# Patient Record
Sex: FEMALE | Race: WHITE | NOT HISPANIC OR LATINO | Employment: FULL TIME | URBAN - METROPOLITAN AREA
[De-identification: names, ages, dates, MRNs, and addresses within clinical notes are randomized per-mention and may not be internally consistent; named-entity substitution may affect disease eponyms.]

---

## 2017-02-21 ENCOUNTER — EXTERNAL ORDER RESULTS (OUTPATIENT)
Dept: HEALTH INFORMATION MANAGEMENT | Facility: CLINIC | Age: 57
End: 2017-02-21

## 2017-02-21 ENCOUNTER — TRANSFERRED RECORDS (OUTPATIENT)
Dept: HEALTH INFORMATION MANAGEMENT | Facility: CLINIC | Age: 57
End: 2017-02-21

## 2017-02-21 LAB — PAP SMEAR - HIM PATIENT REPORTED: NORMAL

## 2017-02-28 ENCOUNTER — MYC REFILL (OUTPATIENT)
Dept: FAMILY MEDICINE | Facility: CLINIC | Age: 57
End: 2017-02-28

## 2017-02-28 ENCOUNTER — MYC MEDICAL ADVICE (OUTPATIENT)
Dept: FAMILY MEDICINE | Facility: CLINIC | Age: 57
End: 2017-02-28

## 2017-02-28 DIAGNOSIS — E03.9 ACQUIRED HYPOTHYROIDISM: ICD-10-CM

## 2017-02-28 RX ORDER — LEVOTHYROXINE SODIUM 100 UG/1
100 TABLET ORAL EVERY MORNING
Qty: 90 TABLET | Refills: 1 | Status: CANCELLED | OUTPATIENT
Start: 2017-02-28

## 2017-02-28 NOTE — TELEPHONE ENCOUNTER
levothyroxine (SYNTHROID) 100 MCG tablet    Last Written Prescription Date: 08/26/16  Last Quantity: 90, # refills: 1  Last Office Visit with G, P or [facility] prescribing provider: Dr. Dorado, 12/16/16       TSH   Date Value Ref Range Status   12/16/2016 4.04 (H) 0.40 - 4.00 mU/L Final

## 2017-03-01 RX ORDER — LEVOTHYROXINE SODIUM 100 UG/1
100 TABLET ORAL EVERY MORNING
Qty: 90 TABLET | Refills: 0 | Status: SHIPPED | OUTPATIENT
Start: 2017-03-01 | End: 2017-05-24

## 2017-03-01 NOTE — TELEPHONE ENCOUNTER
Message from MyChart:  Original authorizing provider: Hector Dorado MD    Claudia Ellison would like a refill of the following medications:  levothyroxine (SYNTHROID) 100 MCG tablet [Hector Dorado MD]    Preferred pharmacy: [id number] - [place], [place] - [address]    Comment:

## 2017-03-01 NOTE — TELEPHONE ENCOUNTER
Bronze, see Mychart update re: pap and mammogram and process   Irlanda Hall, RN, BSN  Message handled by Nurse Triage.

## 2017-03-01 NOTE — TELEPHONE ENCOUNTER
See below  levothyroxine     Last Office Visit with [facility], [facility] or [facility] prescribing provider: 12/16/16        TSH   Date Value Ref Range Status   12/16/2016 4.04 (H) 0.40 - 4.00 mU/L Final   Notes Recorded by Hector Dorado MD on 12/18/2016 at 9:07 PM  tsh is just on the border but the T4 is fine, so lets' sit tight while your  goes through his surgery, I think that you are fine the way you are, lets check in four months. Nice to see both of you.  One refill sent, future order placed, sent mychart response  Prescription approved per [facility] Refill Protocol.  Irlanda Hall, RN, BSN

## 2017-03-02 NOTE — TELEPHONE ENCOUNTER
Duplicate, see other refill, already processed  Irlanda Hall RN, BSN  Message handled by Nurse Triage.

## 2017-03-23 ENCOUNTER — OFFICE VISIT (OUTPATIENT)
Dept: FAMILY MEDICINE | Facility: CLINIC | Age: 57
End: 2017-03-23
Payer: COMMERCIAL

## 2017-03-23 VITALS
RESPIRATION RATE: 12 BRPM | BODY MASS INDEX: 26.16 KG/M2 | WEIGHT: 157 LBS | HEART RATE: 85 BPM | HEIGHT: 65 IN | TEMPERATURE: 98.6 F | OXYGEN SATURATION: 100 % | SYSTOLIC BLOOD PRESSURE: 130 MMHG | DIASTOLIC BLOOD PRESSURE: 80 MMHG

## 2017-03-23 DIAGNOSIS — E03.9 ACQUIRED HYPOTHYROIDISM: ICD-10-CM

## 2017-03-23 DIAGNOSIS — I10 ESSENTIAL HYPERTENSION, BENIGN: Primary | ICD-10-CM

## 2017-03-23 LAB
CREAT UR-MCNC: 75 MG/DL
MICROALBUMIN UR-MCNC: 10 MG/L
MICROALBUMIN/CREAT UR: 13.4 MG/G CR (ref 0–25)
T4 FREE SERPL-MCNC: 1.22 NG/DL (ref 0.76–1.46)
TSH SERPL DL<=0.05 MIU/L-ACNC: 4.76 MU/L (ref 0.4–4)

## 2017-03-23 PROCEDURE — 99213 OFFICE O/P EST LOW 20 MIN: CPT | Performed by: FAMILY MEDICINE

## 2017-03-23 PROCEDURE — 36415 COLL VENOUS BLD VENIPUNCTURE: CPT | Performed by: FAMILY MEDICINE

## 2017-03-23 PROCEDURE — 82043 UR ALBUMIN QUANTITATIVE: CPT | Performed by: FAMILY MEDICINE

## 2017-03-23 PROCEDURE — 84443 ASSAY THYROID STIM HORMONE: CPT | Performed by: FAMILY MEDICINE

## 2017-03-23 PROCEDURE — 84439 ASSAY OF FREE THYROXINE: CPT | Performed by: FAMILY MEDICINE

## 2017-03-23 RX ORDER — GLUCOSAMINE/CHONDR SU A SOD 750-600 MG
TABLET ORAL
COMMUNITY

## 2017-03-23 NOTE — NURSING NOTE
"Chief Complaint   Patient presents with     Recheck Medication     Blood Draw     wants TSH checked     Establish Care       Initial Ht 5' 5\" (1.651 m)  Wt 157 lb (71.2 kg)  BMI 26.13 kg/m2 Estimated body mass index is 26.13 kg/(m^2) as calculated from the following:    Height as of this encounter: 5' 5\" (1.651 m).    Weight as of this encounter: 157 lb (71.2 kg).  Medication Reconciliation: complete   Adelaide Meza, UNIQUE      "

## 2017-03-23 NOTE — PROGRESS NOTES
"  SUBJECTIVE:                                                    Claudia Ellison is a 56 year old female who presents to clinic today for the following health issues:    Patient was previously being seen by Dr. Dorado, but wishes to transfer care to me today.      Hypertension Follow-up      Outpatient blood pressures are not being checked.    Low Salt Diet: low salt    BPs have been labile.  Currently taking losartan 50mg daily.  She went to  for a cold a few days ago and BP was 140/100.  She does not check her BPs at home.      Hypothyroidism Follow-up      Since last visit, patient describes the following symptoms: Weight stable, no hair loss, no skin changes, no constipation, no loose stools    Last checked TSH in December and it was elevated to 4.04.  Synthroid dose was kept stable since she was very stressed back in December.  Needs to recheck levels today.       Amount of exercise or physical activity: 6-7 days/week for an average of 15-30 minutes    Problems taking medications regularly: No    Medication side effects: none    Diet: low salt and carbohydrate counting    Problem list and histories reviewed & adjusted, as indicated.  Additional history: as documented      ROS:  Constitutional, HEENT, cardiovascular, pulmonary, gi and gu systems are negative, except as otherwise noted.    OBJECTIVE:                                                    /80 (BP Location: Right arm, Patient Position: Chair, Cuff Size: Adult Regular)  Pulse 85  Temp 98.6  F (37  C) (Oral)  Resp 12  Ht 5' 5\" (1.651 m)  Wt 157 lb (71.2 kg)  SpO2 100%  BMI 26.13 kg/m2  Body mass index is 26.13 kg/(m^2).  GENERAL: healthy, alert and no distress  RESP: lungs clear to auscultation - no rales, rhonchi or wheezes  CV: regular rates and rhythm, normal S1 S2, no S3 or S4 and no murmur, click or rub     ASSESSMENT/PLAN:                                                      1. Essential hypertension, benign  - BP controlled in " office today, but has been labile   - Advised home BP monitoring for 1-2 weeks (check AM and PM)  - Patient will MyChart message me with home BP readings and if consistently elevated will increase dose of Losartan  - Albumin Random Urine Quantitative  - order for DME; Equipment being ordered: Automatic blood pressure cuff  Dispense: 1 each; Refill: 0    2. Acquired hypothyroidism  - TSH 4.04 back in December and Synthroid dose was kept stable at 100mcg  - Recheck levels today  - TSH  - T4 free    Follow up via Yippyhart in 1-2 weeks.  OV in 6 months or sooner PRN     Ana Bell, DO  [facility]

## 2017-03-23 NOTE — MR AVS SNAPSHOT
"              After Visit Summary   3/23/2017    Claudia Ellison    MRN: [id number]           Patient Information     Date Of Birth          1960        Visit Information        Provider Department      3/23/2017 7:00 AM Ana Bell, DO [facility]        Today's Diagnoses     Essential hypertension, benign    -  1    Acquired hypothyroidism           Follow-ups after your visit        Who to contact     If you have questions or need follow up information about today's clinic visit or your schedule please contact [facility] directly at [phone].  Normal or non-critical lab and imaging results will be communicated to you by Parakeyhart, letter or phone within 4 business days after the clinic has received the results. If you do not hear from us within 7 days, please contact the clinic through [employer] or phone. If you have a critical or abnormal lab result, we will notify you by phone as soon as possible.  Submit refill requests through [employer] or call your pharmacy and they will forward the refill request to us. Please allow 3 business days for your refill to be completed.          Additional Information About Your Visit        MyChart Information     [employer] gives you secure access to your electronic health record. If you see a primary care provider, you can also send messages to your care team and make appointments. If you have questions, please call your primary care clinic.  If you do not have a primary care provider, please call [phone] and they will assist you.        Care EveryWhere ID     This is your Care EveryWhere ID. This could be used by other organizations to access your [facility] medical records  [id number]        Your Vitals Were     Pulse Temperature Respirations Height Pulse Oximetry BMI (Body Mass Index)    85 98.6  F (37  C) (Oral) 12 5' 5\" (1.651 m) 100% 26.13 kg/m2       Blood Pressure from Last 3 Encounters:   03/23/17 130/80   12/16/16 (!) " 142/92   06/15/16 (!) 142/98    Weight from Last 3 Encounters:   03/23/17 157 lb (71.2 kg)   12/16/16 159 lb (72.1 kg)   12/19/15 160 lb 9.6 oz (72.8 kg)              We Performed the Following     Albumin Random Urine Quantitative     T4 free     TSH          Today's Medication Changes          These changes are accurate as of: 3/23/17  7:34 AM.  If you have any questions, ask your nurse or doctor.               Start taking these medicines.        Dose/Directions    order for DME   Used for:  Essential hypertension, benign   Started by:  Ana Bell,         Equipment being ordered: Automatic blood pressure cuff   Quantity:  1 each   Refills:  0            Where to get your medicines      Some of these will need a paper prescription and others can be bought over the counter.  Ask your nurse if you have questions.     Bring a paper prescription for each of these medications     order for DME                Primary Care Provider Office Phone # Fax #    Hector Dorado [phone]       [address] [address]        Thank you!     Thank you for choosing [facility]  for your care. Our goal is always to provide you with excellent care. Hearing back from our patients is one way we can continue to improve our services. Please take a few minutes to complete the written survey that you may receive in the mail after your visit with us. Thank you!             Your Updated Medication List - Protect others around you: Learn how to safely use, store and throw away your medicines at www.disposemymeds.org.          This list is accurate as of: 3/23/17  7:34 AM.  Always use your most recent med list.                   Brand Name Dispense Instructions for use    Biotin 5000 MCG Caps     30 capsule    Take 1 tablet by mouth daily       cholecalciferol 5000 UNITS Caps capsule    vitamin D3    15 capsule    Take 1 capsule (5,000 Units) by  mouth every other day       levothyroxine 100 MCG tablet    SYNTHROID    90 tablet    Take 1 tablet (100 mcg) by mouth every morning       losartan 50 MG tablet    COZAAR    90 tablet    Take 1 tablet (50 mg) by mouth daily       LUTEIN 20 Caps     30 capsule    Take 1 tablet by mouth daily Reported on 3/23/2017       Lutein-Zeaxanthin 25-5 MG Caps          OMEGA-3 FISH OIL PO          order for DME     1 each    Equipment being ordered: Automatic blood pressure cuff       UNABLE TO FIND      Reported on 3/23/2017       VAGIFEM 10 MCG Tabs vaginal tablet   Generic drug:  estradiol     8 tablet    Place 1 tablet vaginally daily.       VITAMIN K2 PO

## 2017-05-24 DIAGNOSIS — E03.9 ACQUIRED HYPOTHYROIDISM: ICD-10-CM

## 2017-05-24 NOTE — TELEPHONE ENCOUNTER
levothyroxine (SYNTHROID/LEVOTHROID) 100 MCG tablet     Last Written Prescription Date: 3/1/17  Last Quantity: 90, # refills: 0  Last Office Visit with [facility], [facility] or [facility] prescribing provider: 3/23/2017          TSH   Date Value Ref Range Status   03/23/2017 4.76 (H) 0.40 - 4.00 mU/L Final     LUKE Elise  May 24, 2017  8:36 AM

## 2017-05-26 RX ORDER — LEVOTHYROXINE SODIUM 100 UG/1
TABLET ORAL
Qty: 90 TABLET | Refills: 0 | Status: SHIPPED | OUTPATIENT
Start: 2017-05-26 | End: 2017-07-10

## 2017-05-26 NOTE — TELEPHONE ENCOUNTER
Routing refill request to provider for review/approval because:  Labs out of range:   TSH    Giovanna Bryant RN, BS  Clinical Nurse Triage.

## 2017-06-13 ENCOUNTER — MYC REFILL (OUTPATIENT)
Dept: FAMILY MEDICINE | Facility: CLINIC | Age: 57
End: 2017-06-13

## 2017-06-13 DIAGNOSIS — I10 BENIGN ESSENTIAL HYPERTENSION: ICD-10-CM

## 2017-06-13 RX ORDER — LOSARTAN POTASSIUM 50 MG/1
50 TABLET ORAL DAILY
Qty: 90 TABLET | Refills: 1 | Status: CANCELLED | OUTPATIENT
Start: 2017-06-13

## 2017-06-13 NOTE — TELEPHONE ENCOUNTER
Losartan 50 mg       Last Written Prescription Date: 12/16/16  Last Fill Quantity: 90, # refills: 1  Last Office Visit with [facility] or [facility] prescribing provider: 12/16/16 Dr. Dorado       Potassium   Date Value Ref Range Status   12/16/2016 4.1 3.4 - 5.3 mmol/L Final     Creatinine   Date Value Ref Range Status   12/16/2016 0.89 0.52 - 1.04 mg/dL Final     BP Readings from Last 3 Encounters:   03/23/17 130/80   12/16/16 (!) 142/92   06/15/16 (!) 142/98

## 2017-06-14 RX ORDER — LOSARTAN POTASSIUM 50 MG/1
TABLET ORAL
Qty: 90 TABLET | Refills: 1 | Status: SHIPPED | OUTPATIENT
Start: 2017-06-14 | End: 2017-10-06

## 2017-06-14 NOTE — TELEPHONE ENCOUNTER
Prescription approved per [facility] Refill Protocol.  Irlanda Hall RN, BSN

## 2017-06-14 NOTE — TELEPHONE ENCOUNTER
See 3 refill requests, will close this one  Irlanda Hall RN, BSN  Message handled by Nurse Triage.

## 2017-07-10 DIAGNOSIS — E03.9 ACQUIRED HYPOTHYROIDISM: ICD-10-CM

## 2017-07-10 NOTE — TELEPHONE ENCOUNTER
Pending Prescriptions:                       Disp   Refills    levothyroxine (SYNTHROID/LEVOTHROID) 100 *90 tab*0            Sig: TAKE ONE TABLET BY MOUTH IN THE MORNING              Last Written Prescription Date: 5/26/2017  Last Quantity: 90, # refills: 0  Last Office Visit with [facility], [facility] or [facility] prescribing provider: 3/23/2017, Ray        TSH   Date Value Ref Range Status   03/23/2017 4.76 (H) 0.40 - 4.00 mU/L Final

## 2017-07-11 NOTE — TELEPHONE ENCOUNTER
Routing refill request to provider for review/approval because:  Labs out of range:  TSH    Dr. Bell, would you like patient to retest TSH? Per your [facility] message in 03/2017, you stated you were ok leaving the dose the same if patient was asymptomatic.  Patient finally answered in 06/2017 and prefers to keep the Synthroid dose the same.  Not sure when you'd like her to retest.    Thank you,  Hilda Sumner RN

## 2017-07-12 RX ORDER — LEVOTHYROXINE SODIUM 100 UG/1
TABLET ORAL
Qty: 90 TABLET | Refills: 0 | Status: SHIPPED | OUTPATIENT
Start: 2017-07-12 | End: 2017-10-01

## 2017-07-12 NOTE — TELEPHONE ENCOUNTER
LM with  to have pt call back for message below  Irlanda Hall RN, BSN  Message handled by Nurse Triage.

## 2017-10-01 ENCOUNTER — MYC REFILL (OUTPATIENT)
Dept: FAMILY MEDICINE | Facility: CLINIC | Age: 57
End: 2017-10-01

## 2017-10-01 DIAGNOSIS — E03.9 ACQUIRED HYPOTHYROIDISM: ICD-10-CM

## 2017-10-02 RX ORDER — LEVOTHYROXINE SODIUM 100 UG/1
100 TABLET ORAL DAILY
Qty: 30 TABLET | Refills: 0 | Status: SHIPPED | OUTPATIENT
Start: 2017-10-02 | End: 2017-11-10

## 2017-10-02 NOTE — TELEPHONE ENCOUNTER
Message from [employer]:  Original authorizing provider: DO Claudia Wade would like a refill of the following medications:  levothyroxine (SYNTHROID/LEVOTHROID) 100 MCG tablet [Ana Bell DO]    Preferred pharmacy: [id number] - [place], [place] - [address]    Comment:  I have 11 pills left and zero refills. Let me know what I need to schedule to get this prescription refilled. Thank you Claudia Ellison

## 2017-10-02 NOTE — TELEPHONE ENCOUNTER
Synthroid      Last Written Prescription Date: 7/12/2017  Last Quantity: 90, # refills: 0  Last Office Visit with [facility] or [facility] prescribing provider: 3/23/2017        TSH   Date Value Ref Range Status   03/23/2017 4.76 (H) 0.40 - 4.00 mU/L Final       Routing refill request to provider for review/approval because:  Labs out of range:  TSH     Per pt. last refill request, patient was advised to recheck her labs. Please please correct lab orders. I have informed patient that she is to come in for a lab only.     May MONCADA RN, BSN, PHN  [place] [facility]

## 2017-10-02 NOTE — TELEPHONE ENCOUNTER
Sent in a 30 day supply.  Patient needs to come in for OV for BP check as well.  We can get her labs done at that visit.

## 2017-10-03 NOTE — TELEPHONE ENCOUNTER
Pt needs office appointment, sent [employer] message informing, will monitor response    Ana Bellgh, DO      10:38 AM   Note      Sent in a 30 day supply.  Patient needs to come in for OV for BP check as well.  We can get her labs done at that visit.          Irlanda Hall RN, BSN  Message handled by Nurse Triage.

## 2017-10-06 ENCOUNTER — OFFICE VISIT (OUTPATIENT)
Dept: FAMILY MEDICINE | Facility: CLINIC | Age: 57
End: 2017-10-06
Payer: COMMERCIAL

## 2017-10-06 VITALS
WEIGHT: 158 LBS | DIASTOLIC BLOOD PRESSURE: 94 MMHG | RESPIRATION RATE: 12 BRPM | HEART RATE: 72 BPM | BODY MASS INDEX: 26.29 KG/M2 | TEMPERATURE: 98.4 F | SYSTOLIC BLOOD PRESSURE: 138 MMHG

## 2017-10-06 DIAGNOSIS — R13.10 DYSPHAGIA, UNSPECIFIED TYPE: ICD-10-CM

## 2017-10-06 DIAGNOSIS — Z11.59 NEED FOR HEPATITIS C SCREENING TEST: ICD-10-CM

## 2017-10-06 DIAGNOSIS — E03.9 ACQUIRED HYPOTHYROIDISM: ICD-10-CM

## 2017-10-06 DIAGNOSIS — I10 ESSENTIAL HYPERTENSION, BENIGN: Primary | ICD-10-CM

## 2017-10-06 LAB
ALBUMIN SERPL-MCNC: 4 G/DL (ref 3.4–5)
ALP SERPL-CCNC: 69 U/L (ref 40–150)
ALT SERPL W P-5'-P-CCNC: 26 U/L (ref 0–50)
ANION GAP SERPL CALCULATED.3IONS-SCNC: 10 MMOL/L (ref 3–14)
AST SERPL W P-5'-P-CCNC: 18 U/L (ref 0–45)
BILIRUB SERPL-MCNC: 0.8 MG/DL (ref 0.2–1.3)
BUN SERPL-MCNC: 12 MG/DL (ref 7–30)
CALCIUM SERPL-MCNC: 9.2 MG/DL (ref 8.5–10.1)
CHLORIDE SERPL-SCNC: 102 MMOL/L (ref 94–109)
CO2 SERPL-SCNC: 25 MMOL/L (ref 20–32)
CREAT SERPL-MCNC: 0.82 MG/DL (ref 0.52–1.04)
GFR SERPL CREATININE-BSD FRML MDRD: 72 ML/MIN/1.7M2
GLUCOSE SERPL-MCNC: 87 MG/DL (ref 70–99)
HCV AB SERPL QL IA: NONREACTIVE
POTASSIUM SERPL-SCNC: 4.7 MMOL/L (ref 3.4–5.3)
PROT SERPL-MCNC: 7.5 G/DL (ref 6.8–8.8)
SODIUM SERPL-SCNC: 137 MMOL/L (ref 133–144)
T4 FREE SERPL-MCNC: 1.21 NG/DL (ref 0.76–1.46)
TSH SERPL DL<=0.005 MIU/L-ACNC: 4.56 MU/L (ref 0.4–4)

## 2017-10-06 PROCEDURE — 84439 ASSAY OF FREE THYROXINE: CPT | Performed by: FAMILY MEDICINE

## 2017-10-06 PROCEDURE — 86803 HEPATITIS C AB TEST: CPT | Performed by: FAMILY MEDICINE

## 2017-10-06 PROCEDURE — 99214 OFFICE O/P EST MOD 30 MIN: CPT | Performed by: FAMILY MEDICINE

## 2017-10-06 PROCEDURE — 84443 ASSAY THYROID STIM HORMONE: CPT | Performed by: FAMILY MEDICINE

## 2017-10-06 PROCEDURE — 36415 COLL VENOUS BLD VENIPUNCTURE: CPT | Performed by: FAMILY MEDICINE

## 2017-10-06 PROCEDURE — 80053 COMPREHEN METABOLIC PANEL: CPT | Performed by: FAMILY MEDICINE

## 2017-10-06 RX ORDER — LOSARTAN POTASSIUM AND HYDROCHLOROTHIAZIDE 12.5; 5 MG/1; MG/1
1 TABLET ORAL DAILY
Qty: 90 TABLET | Refills: 0 | Status: SHIPPED | OUTPATIENT
Start: 2017-10-06 | End: 2017-12-29

## 2017-10-06 NOTE — MR AVS SNAPSHOT
After Visit Summary   10/6/2017    Claudia Ellison    MRN: [id number]           Patient Information     Date Of Birth          1960        Visit Information        Provider Department      10/6/2017 8:20 AM Ana Bell,  [facility]        Today's Diagnoses     Essential hypertension, benign    -  1    Acquired hypothyroidism        Need for hepatitis C screening test        Dysphagia, unspecified type           Follow-ups after your visit        Future tests that were ordered for you today     Open Future Orders        Priority Expected Expires Ordered    US Thyroid Routine  10/6/2018 10/6/2017            Who to contact     If you have questions or need follow up information about today's clinic visit or your schedule please contact [facility] directly at [phone].  Normal or non-critical lab and imaging results will be communicated to you by Raise Your Flaghart, letter or phone within 4 business days after the clinic has received the results. If you do not hear from us within 7 days, please contact the clinic through [employer] or phone. If you have a critical or abnormal lab result, we will notify you by phone as soon as possible.  Submit refill requests through [employer] or call your pharmacy and they will forward the refill request to us. Please allow 3 business days for your refill to be completed.          Additional Information About Your Visit        MyChart Information     [employer] gives you secure access to your electronic health record. If you see a primary care provider, you can also send messages to your care team and make appointments. If you have questions, please call your primary care clinic.  If you do not have a primary care provider, please call [phone] and they will assist you.        Care EveryWhere ID     This is your Care EveryWhere ID. This could be used by other organizations to access your [facility] medical records  [id number]         Your Vitals Were     Pulse Temperature Respirations BMI (Body Mass Index)          72 98.4  F (36.9  C) (Oral) 12 26.29 kg/m2         Blood Pressure from Last 3 Encounters:   10/06/17 (!) 138/94   03/23/17 130/80   12/16/16 (!) 142/92    Weight from Last 3 Encounters:   10/06/17 158 lb (71.7 kg)   03/23/17 157 lb (71.2 kg)   12/16/16 159 lb (72.1 kg)              We Performed the Following     Comprehensive metabolic panel     Hepatitis C antibody     T4 free     TSH          Today's Medication Changes          These changes are accurate as of: 10/6/17  8:46 AM.  If you have any questions, ask your nurse or doctor.               Start taking these medicines.        Dose/Directions    losartan-hydrochlorothiazide 50-12.5 MG per tablet   Commonly known as:  HYZAAR   Used for:  Essential hypertension, benign   Started by:  Ana Bell DO        Dose:  1 tablet   Take 1 tablet by mouth daily   Quantity:  90 tablet   Refills:  0         Stop taking these medicines if you haven't already. Please contact your care team if you have questions.     losartan 50 MG tablet   Commonly known as:  COZAAR   Stopped by:  Ana Bell DO                Where to get your medicines      These medications were sent to [id number] - [place], [place] - [address]  [address] [place] [address]     Phone:  [phone]     losartan-hydrochlorothiazide 50-12.5 MG per tablet                Primary Care Provider Office Phone # Fax #    Ana Bell [phone] [address] [address]        Equal Access to Services     [facility] : Hadii ap barros hadasho Soomaali, waaxda luqadaha, qaybta kaalmada adelinn, blu todd. So [facility] [phone].    ATENCIÓN: Si habla español, tiene a sanchez disposición servicios gratuitos de asistencia lingüística. Llame al [phone].    We comply with applicable federal civil rights laws and [place] laws.  We do not discriminate on the basis of race, color, national origin, age, disability, sex, sexual orientation, or gender identity.            Thank you!     Thank you for choosing [facility]  for your care. Our goal is always to provide you with excellent care. Hearing back from our patients is one way we can continue to improve our services. Please take a few minutes to complete the written survey that you may receive in the mail after your visit with us. Thank you!             Your Updated Medication List - Protect others around you: Learn how to safely use, store and throw away your medicines at www.disposemymeds.org.          This list is accurate as of: 10/6/17  8:46 AM.  Always use your most recent med list.                   Brand Name Dispense Instructions for use Diagnosis    Biotin 5000 MCG Caps     30 capsule    Take 1 tablet by mouth daily        CHELATED MAGNESIUM PO      Take 250 mg by mouth        cholecalciferol 5000 UNITS Caps capsule    vitamin D3    15 capsule    Take 1 capsule (5,000 Units) by mouth every other day        levothyroxine 100 MCG tablet    SYNTHROID/LEVOTHROID    30 tablet    Take 1 tablet (100 mcg) by mouth daily    Acquired hypothyroidism       losartan-hydrochlorothiazide 50-12.5 MG per tablet    HYZAAR    90 tablet    Take 1 tablet by mouth daily    Essential hypertension, benign       LUTEIN 20 Caps     30 capsule    Take 1 tablet by mouth daily Reported on 3/23/2017        Lutein-Zeaxanthin 25-5 MG Caps           OMEGA-3 FISH OIL PO           order for DME     1 each    Equipment being ordered: Automatic blood pressure cuff    Essential hypertension, benign       UNABLE TO FIND      Reported on 3/23/2017        VAGIFEM 10 MCG Tabs vaginal tablet   Generic drug:  estradiol     8 tablet    Place 1 tablet vaginally daily.        VITAMIN K2 PO

## 2017-10-06 NOTE — NURSING NOTE
"Chief Complaint   Patient presents with     Hypertension     Blood Draw       Initial Wt 158 lb (71.7 kg)  BMI 26.29 kg/m2 Estimated body mass index is 26.29 kg/(m^2) as calculated from the following:    Height as of 3/23/17: 5' 5\" (1.651 m).    Weight as of this encounter: 158 lb (71.7 kg).  Medication Reconciliation: complete   Adelaide Meza, UNIQUE      "

## 2017-10-06 NOTE — PROGRESS NOTES
Answers for HPI/ROS submitted by the patient on 10/4/2017   Chronic problems general questions HPI Form  Outpatient blood pressures: are not being checked  Dietary sodium intake:: Low salt diet

## 2017-10-12 ENCOUNTER — HOSPITAL ENCOUNTER (OUTPATIENT)
Dept: ULTRASOUND IMAGING | Facility: CLINIC | Age: 57
Discharge: HOME | End: 2017-10-12
Attending: FAMILY MEDICINE | Admitting: FAMILY MEDICINE
Payer: COMMERCIAL

## 2017-10-12 DIAGNOSIS — E03.9 ACQUIRED HYPOTHYROIDISM: ICD-10-CM

## 2017-10-12 DIAGNOSIS — R13.10 DYSPHAGIA, UNSPECIFIED TYPE: ICD-10-CM

## 2017-10-12 PROCEDURE — 76536 US EXAM OF HEAD AND NECK: CPT

## 2017-11-10 DIAGNOSIS — E03.9 ACQUIRED HYPOTHYROIDISM: ICD-10-CM

## 2017-11-14 RX ORDER — LEVOTHYROXINE SODIUM 100 UG/1
TABLET ORAL
Qty: 90 TABLET | Refills: 3 | Status: SHIPPED | OUTPATIENT
Start: 2017-11-14 | End: 2018-10-25

## 2017-11-14 NOTE — TELEPHONE ENCOUNTER
See last TSH message, pt reviewed message, has not contacted us for dose adjustment  Prescription approved per [facility] Refill Protocol.  Irlanda Hall, RN, BSN

## 2017-11-27 ENCOUNTER — TELEPHONE (OUTPATIENT)
Dept: FAMILY MEDICINE | Facility: CLINIC | Age: 57
End: 2017-11-27

## 2017-11-27 NOTE — TELEPHONE ENCOUNTER
Panel Management Review      Patient has the following on her problem list:     Hypertension   Last three blood pressure readings:  BP Readings from Last 3 Encounters:   10/06/17 (!) 138/94   03/23/17 130/80   12/16/16 (!) 142/92     Blood pressure: FAILED    HTN Guidelines:  Age 18-59 BP range:  Less than 140/90  Age 60-85 with Diabetes:  Less than 140/90  Age 60-85 without Diabetes:  less than 150/90        Composite cancer screening  Chart review shows that this patient is due/due soon for the following None  Summary:    Patient is due/failing the following:   BP CHECK    Action needed:   Patient needs nurse only appointment.    Type of outreach:    patient does not need to be contacted according to last OV note needs a bp f/u in January. A new med was added in October, will postpone till then    Questions for provider review:    None                                                                                                                                    Adelaide Meza CMA       Chart routed to Care Team .

## 2017-12-09 DIAGNOSIS — I10 BENIGN ESSENTIAL HYPERTENSION: ICD-10-CM

## 2017-12-12 RX ORDER — LOSARTAN POTASSIUM 50 MG/1
TABLET ORAL
Qty: 90 TABLET | Refills: 0 | Status: SHIPPED | OUTPATIENT
Start: 2017-12-12 | End: 2017-12-29

## 2017-12-12 NOTE — TELEPHONE ENCOUNTER
Requested Prescriptions   Pending Prescriptions Disp Refills     losartan (COZAAR) 50 MG tablet [Pharmacy Med Name: Losartan Potassium Oral Tablet 50 MG] 90 tablet 0     Sig: TAKE ONE TABLET BY MOUTH ONE TIME DAILY    Angiotensin-II Receptors Failed    12/9/2017  7:01 AM       Failed - Blood pressure under 140/90 in past 12 months.    BP Readings from Last 3 Encounters:   10/06/17 (!) 138/94   03/23/17 130/80   12/16/16 (!) 142/92                Passed - Recent or future visit with authorizing provider's specialty    Patient had office visit in the last year or has a visit in the next 30 days with authorizing provider.  See chart review.              Passed - Patient is age 18 or older       Passed - No active pregnancy on record       Passed - Normal serum creatinine on file in past 12 months    Recent Labs   Lab Test  10/06/17   0843   CR  0.82            Passed - Normal serum potassium on file in past 12 months    Recent Labs   Lab Test  10/06/17   0843   POTASSIUM  4.7                   Passed - No positive pregnancy test in past 12 months        Requested Prescriptions   Signed Prescriptions Disp Refills     losartan (COZAAR) 50 MG tablet 90 tablet 0     Sig: TAKE ONE TABLET BY MOUTH ONE TIME DAILY    Angiotensin-II Receptors Failed    12/9/2017  7:01 AM       Failed - Blood pressure under 140/90 in past 12 months.    BP Readings from Last 3 Encounters:   10/06/17 (!) 138/94   03/23/17 130/80   12/16/16 (!) 142/92                Passed - Recent or future visit with authorizing provider's specialty    Patient had office visit in the last year or has a visit in the next 30 days with authorizing provider.  See chart review.              Passed - Patient is age 18 or older       Passed - No active pregnancy on record       Passed - Normal serum creatinine on file in past 12 months    Recent Labs   Lab Test  10/06/17   0843   CR  0.82            Passed - Normal serum potassium on file in past 12 months    Recent  Labs   Lab Test  10/06/17   0843   POTASSIUM  4.7                   Passed - No positive pregnancy test in past 12 months        Prescription approved per [facility] Refill Protocol.    Eveline Martinez RN -- [facility] Workforce

## 2017-12-22 ENCOUNTER — TELEPHONE (OUTPATIENT)
Dept: FAMILY MEDICINE | Facility: CLINIC | Age: 57
End: 2017-12-22

## 2017-12-22 NOTE — TELEPHONE ENCOUNTER
LM on  to call back.    Would like to triage patient.     Patient stating she is having side effects to medications      ----- Message -----        From: Claudia Ellison        Sent: 12/21/2017   5:49 AM          To: Wilfrid Schedulers     Subject: Appointment Request                                    Appointment Request From: Claudia Ellison          With Provider: Ana Bell DO [-Primary Care Physician-]          Preferred Date Range: From 12/21/2017 To 12/21/2017          Preferred Times: Any          Reason for visit: Request an Appointment          Comments:     I want to. Discuss the medications currently taking and the side effects I'm having

## 2017-12-29 ENCOUNTER — OFFICE VISIT (OUTPATIENT)
Dept: FAMILY MEDICINE | Facility: CLINIC | Age: 57
End: 2017-12-29
Payer: COMMERCIAL

## 2017-12-29 VITALS
HEIGHT: 65 IN | DIASTOLIC BLOOD PRESSURE: 80 MMHG | RESPIRATION RATE: 14 BRPM | OXYGEN SATURATION: 97 % | SYSTOLIC BLOOD PRESSURE: 128 MMHG | BODY MASS INDEX: 25.83 KG/M2 | WEIGHT: 155 LBS | TEMPERATURE: 99.5 F | HEART RATE: 98 BPM

## 2017-12-29 DIAGNOSIS — I10 ESSENTIAL HYPERTENSION, BENIGN: ICD-10-CM

## 2017-12-29 DIAGNOSIS — R00.2 HEART PALPITATIONS: ICD-10-CM

## 2017-12-29 DIAGNOSIS — E03.9 ACQUIRED HYPOTHYROIDISM: Primary | ICD-10-CM

## 2017-12-29 LAB
T4 FREE SERPL-MCNC: 1.3 NG/DL (ref 0.76–1.46)
TSH SERPL DL<=0.005 MIU/L-ACNC: 2.82 MU/L (ref 0.4–4)

## 2017-12-29 PROCEDURE — 84443 ASSAY THYROID STIM HORMONE: CPT | Performed by: FAMILY MEDICINE

## 2017-12-29 PROCEDURE — 36415 COLL VENOUS BLD VENIPUNCTURE: CPT | Performed by: FAMILY MEDICINE

## 2017-12-29 PROCEDURE — 99214 OFFICE O/P EST MOD 30 MIN: CPT | Performed by: FAMILY MEDICINE

## 2017-12-29 PROCEDURE — 84439 ASSAY OF FREE THYROXINE: CPT | Performed by: FAMILY MEDICINE

## 2017-12-29 RX ORDER — LOSARTAN POTASSIUM AND HYDROCHLOROTHIAZIDE 12.5; 5 MG/1; MG/1
1 TABLET ORAL DAILY
Qty: 90 TABLET | Refills: 0 | Status: SHIPPED | OUTPATIENT
Start: 2017-12-29 | End: 2018-04-05

## 2017-12-29 NOTE — MR AVS SNAPSHOT
"              After Visit Summary   12/29/2017    Claudia Ellison    MRN: [id number]           Patient Information     Date Of Birth          1960        Visit Information        Provider Department      12/29/2017 7:40 AM Ana Bell, DO [facility]        Today's Diagnoses     Acquired hypothyroidism    -  1    Essential hypertension, benign        Heart palpitations           Follow-ups after your visit        Who to contact     If you have questions or need follow up information about today's clinic visit or your schedule please contact [facility] directly at [phone].  Normal or non-critical lab and imaging results will be communicated to you by [employer], letter or phone within 4 business days after the clinic has received the results. If you do not hear from us within 7 days, please contact the clinic through [employer] or phone. If you have a critical or abnormal lab result, we will notify you by phone as soon as possible.  Submit refill requests through [employer] or call your pharmacy and they will forward the refill request to us. Please allow 3 business days for your refill to be completed.          Additional Information About Your Visit        MyChart Information     [employer] gives you secure access to your electronic health record. If you see a primary care provider, you can also send messages to your care team and make appointments. If you have questions, please call your primary care clinic.  If you do not have a primary care provider, please call [phone] and they will assist you.        Care EveryWhere ID     This is your Care EveryWhere ID. This could be used by other organizations to access your [place] medical records  [id number]        Your Vitals Were     Pulse Temperature Respirations Height Pulse Oximetry BMI (Body Mass Index)    98 99.5  F (37.5  C) (Oral) 14 5' 5\" (1.651 m) 97% 25.79 kg/m2       Blood Pressure from Last 3 Encounters: "   12/29/17 128/80   10/06/17 (!) 138/94   03/23/17 130/80    Weight from Last 3 Encounters:   12/29/17 155 lb (70.3 kg)   10/06/17 158 lb (71.7 kg)   03/23/17 157 lb (71.2 kg)              We Performed the Following     T4 free     TSH          Today's Medication Changes          These changes are accurate as of: 12/29/17  8:10 AM.  If you have any questions, ask your nurse or doctor.               Stop taking these medicines if you haven't already. Please contact your care team if you have questions.     losartan 50 MG tablet   Commonly known as:  COZAAR   Stopped by:  Ana Bell,                 Where to get your medicines      These medications were sent to [id number] - [place], [place] - [address]  [address] [address]     Phone:  [phone]     losartan-hydrochlorothiazide 50-12.5 MG per tablet                Primary Care Provider Office Phone # Fax #    Ana Bell [phone] [address] [address]        Equal Access to Services     [facility]: Hadii ap ku hadasho Sojacobyali, waaxda luqadaha, qaybta kaalmada adeegyada, blu cid . So [facility] [phone].    ATENCIÓN: Si habla español, tiene a sanchez disposición servicios gratuitos de asistencia lingüística. Llame al [phone].    We comply with applicable federal civil rights laws and [place] laws. We do not discriminate on the basis of race, color, national origin, age, disability, sex, sexual orientation, or gender identity.            Thank you!     Thank you for choosing [facility]  for your care. Our goal is always to provide you with excellent care. Hearing back from our patients is one way we can continue to improve our services. Please take a few minutes to complete the written survey that you may receive in the mail after your visit with us. Thank you!             Your Updated Medication List - Protect others  around you: Learn how to safely use, store and throw away your medicines at www.disposemymeds.org.          This list is accurate as of: 12/29/17  8:10 AM.  Always use your most recent med list.                   Brand Name Dispense Instructions for use Diagnosis    Biotin 5000 MCG Caps     30 capsule    Take 1 tablet by mouth daily        CHELATED MAGNESIUM PO      Take 250 mg by mouth        cholecalciferol 5000 UNITS Caps capsule    vitamin D3    15 capsule    Take 1 capsule (5,000 Units) by mouth every other day        levothyroxine 100 MCG tablet    SYNTHROID/LEVOTHROID    90 tablet    Take 1 tablet (100 mcg) by mouth daily    Acquired hypothyroidism       losartan-hydrochlorothiazide 50-12.5 MG per tablet    HYZAAR    90 tablet    Take 1 tablet by mouth daily    Essential hypertension, benign       LUTEIN 20 Caps     30 capsule    Take 1 tablet by mouth daily Reported on 3/23/2017        Lutein-Zeaxanthin 25-5 MG Caps           OMEGA-3 FISH OIL PO           order for DME     1 each    Equipment being ordered: Automatic blood pressure cuff    Essential hypertension, benign       UNABLE TO FIND      Reported on 3/23/2017        VAGIFEM 10 MCG Tabs vaginal tablet   Generic drug:  estradiol     8 tablet    Place 1 tablet vaginally daily.        VITAMIN K2 PO

## 2018-03-09 ENCOUNTER — HEALTH MAINTENANCE LETTER (OUTPATIENT)
Age: 58
End: 2018-03-09

## 2018-04-05 ENCOUNTER — MYC REFILL (OUTPATIENT)
Dept: FAMILY MEDICINE | Facility: CLINIC | Age: 58
End: 2018-04-05

## 2018-04-05 DIAGNOSIS — I10 ESSENTIAL HYPERTENSION, BENIGN: ICD-10-CM

## 2018-04-06 RX ORDER — LOSARTAN POTASSIUM AND HYDROCHLOROTHIAZIDE 12.5; 5 MG/1; MG/1
1 TABLET ORAL DAILY
Qty: 90 TABLET | Refills: 2 | Status: SHIPPED | OUTPATIENT
Start: 2018-04-06 | End: 2018-11-29

## 2018-04-06 NOTE — TELEPHONE ENCOUNTER
Prescription approved per [facility] Refill Protocol.  Bernard Lieberman RN, BSN

## 2018-04-06 NOTE — TELEPHONE ENCOUNTER
"Requested Prescriptions   Pending Prescriptions Disp Refills     losartan-hydrochlorothiazide (HYZAAR) 50-12.5 MG per tablet 90 tablet 0    Last Written Prescription Date:  12/29/17  Last Fill Quantity: 90,  # refills: 0   Last Office Visit: 12/29/2017   Future Office Visit:      Sig: Take 1 tablet by mouth daily    Angiotensin-II Receptors Passed    4/6/2018  7:34 AM       Passed - Blood pressure under 140/90 in past 12 months    BP Readings from Last 3 Encounters:   12/29/17 128/80   10/06/17 (!) 138/94   03/23/17 130/80                Passed - Recent (12 mo) or future (30 days) visit within the authorizing provider's specialty    Patient had office visit in the last 12 months or has a visit in the next 30 days with authorizing provider or within the authorizing provider's specialty.  See \"Patient Info\" tab in inbasket, or \"Choose Columns\" in Meds & Orders section of the refill encounter.           Passed - Patient is age 18 or older       Passed - No active pregnancy on record       Passed - Normal serum creatinine on file in past 12 months    Recent Labs   Lab Test  10/06/17   0843   CR  0.82            Passed - Normal serum potassium on file in past 12 months    Recent Labs   Lab Test  10/06/17   0843   POTASSIUM  4.7                   Passed - No positive pregnancy test in past 12 months          "

## 2018-04-06 NOTE — TELEPHONE ENCOUNTER
Message from [employer]:  Original authorizing provider: DO Claudia Wade would like a refill of the following medications:  losartan-hydrochlorothiazide (HYZAAR) 50-12.5 MG per tablet [Ana Bell DO]    Preferred pharmacy: [id number] - [facility] [address]    Comment:  I have one week left of this medication. The bottle indicates 0 refills. Can this be fill or do I need to be seen? Thank you, Claudia

## 2018-07-25 ENCOUNTER — TRANSFERRED RECORDS (OUTPATIENT)
Dept: HEALTH INFORMATION MANAGEMENT | Facility: CLINIC | Age: 58
End: 2018-07-25

## 2018-07-25 LAB — PAP SMEAR - HIM PATIENT REPORTED: NEGATIVE

## 2018-08-25 ENCOUNTER — TRANSFERRED RECORDS (OUTPATIENT)
Dept: HEALTH INFORMATION MANAGEMENT | Facility: CLINIC | Age: 58
End: 2018-08-25

## 2018-10-25 ENCOUNTER — MYC MEDICAL ADVICE (OUTPATIENT)
Dept: FAMILY MEDICINE | Facility: CLINIC | Age: 58
End: 2018-10-25

## 2018-10-25 DIAGNOSIS — E03.9 ACQUIRED HYPOTHYROIDISM: ICD-10-CM

## 2018-10-25 RX ORDER — LEVOTHYROXINE SODIUM 100 UG/1
TABLET ORAL
Qty: 30 TABLET | Refills: 0 | Status: SHIPPED | OUTPATIENT
Start: 2018-10-25 | End: 2018-11-29

## 2018-10-25 NOTE — TELEPHONE ENCOUNTER
Sent [employer] message, due for annual  Prescription approved per [facility] Refill Protocol.  Irlanda Hall RN, BSN

## 2018-11-07 DIAGNOSIS — E03.9 ACQUIRED HYPOTHYROIDISM: ICD-10-CM

## 2018-11-08 RX ORDER — LEVOTHYROXINE SODIUM 100 UG/1
TABLET ORAL
Qty: 30 TABLET | Refills: 0 | Status: SHIPPED | OUTPATIENT
Start: 2018-11-08 | End: 2018-12-03

## 2018-11-08 NOTE — TELEPHONE ENCOUNTER
"Last Written Prescription Date:  10/25/18  Last Fill Quantity: 30 tablet,  # refills: 0   Last office visit: 12/29/2017 with prescribing provider:  VERONIKA Bell   Future Office Visit:      Notes to Pharmacy: May have #30 if needed, due for annual visit    Requested Prescriptions   Pending Prescriptions Disp Refills     levothyroxine (SYNTHROID/LEVOTHROID) 100 MCG tablet [Pharmacy Med Name: Levothyroxine Sodium Oral Tablet 100 MCG] 90 tablet 2     Sig: Take 1 tablet (100 mcg) by mouth daily    Thyroid Protocol Passed    11/7/2018  7:01 AM       Passed - Patient is 12 years or older       Passed - Recent (12 mo) or future (30 days) visit within the authorizing provider's specialty    Patient had office visit in the last 12 months or has a visit in the next 30 days with authorizing provider or within the authorizing provider's specialty.  See \"Patient Info\" tab in inbasket, or \"Choose Columns\" in Meds & Orders section of the refill encounter.             Passed - Normal TSH on file in past 12 months    Recent Labs   Lab Test  12/29/17   0807   TSH  2.82             Passed - No active pregnancy on record    If patient is pregnant or has had a positive pregnancy test, please check TSH.         Passed - No positive pregnancy test in past 12 months    If patient is pregnant or has had a positive pregnancy test, please check TSH.            "

## 2018-11-29 ENCOUNTER — OFFICE VISIT (OUTPATIENT)
Dept: FAMILY MEDICINE | Facility: CLINIC | Age: 58
End: 2018-11-29
Payer: COMMERCIAL

## 2018-11-29 VITALS
WEIGHT: 160 LBS | RESPIRATION RATE: 12 BRPM | SYSTOLIC BLOOD PRESSURE: 124 MMHG | TEMPERATURE: 99.7 F | BODY MASS INDEX: 26.66 KG/M2 | OXYGEN SATURATION: 96 % | HEART RATE: 90 BPM | HEIGHT: 65 IN | DIASTOLIC BLOOD PRESSURE: 80 MMHG

## 2018-11-29 DIAGNOSIS — Z12.11 COLON CANCER SCREENING: ICD-10-CM

## 2018-11-29 DIAGNOSIS — E03.9 ACQUIRED HYPOTHYROIDISM: ICD-10-CM

## 2018-11-29 DIAGNOSIS — N95.2 VAGINAL ATROPHY: ICD-10-CM

## 2018-11-29 DIAGNOSIS — Z00.00 ROUTINE HISTORY AND PHYSICAL EXAMINATION OF ADULT: Primary | ICD-10-CM

## 2018-11-29 DIAGNOSIS — I10 ESSENTIAL HYPERTENSION, BENIGN: ICD-10-CM

## 2018-11-29 LAB
ALBUMIN SERPL-MCNC: 4 G/DL (ref 3.4–5)
ALP SERPL-CCNC: 63 U/L (ref 40–150)
ALT SERPL W P-5'-P-CCNC: 29 U/L (ref 0–50)
ANION GAP SERPL CALCULATED.3IONS-SCNC: 8 MMOL/L (ref 3–14)
AST SERPL W P-5'-P-CCNC: 24 U/L (ref 0–45)
BILIRUB SERPL-MCNC: 1 MG/DL (ref 0.2–1.3)
BUN SERPL-MCNC: 12 MG/DL (ref 7–30)
CALCIUM SERPL-MCNC: 8.9 MG/DL (ref 8.5–10.1)
CHLORIDE SERPL-SCNC: 99 MMOL/L (ref 94–109)
CHOLEST SERPL-MCNC: 184 MG/DL
CO2 SERPL-SCNC: 28 MMOL/L (ref 20–32)
CREAT SERPL-MCNC: 0.76 MG/DL (ref 0.52–1.04)
CREAT UR-MCNC: 57 MG/DL
GFR SERPL CREATININE-BSD FRML MDRD: 78 ML/MIN/1.7M2
GLUCOSE SERPL-MCNC: 90 MG/DL (ref 70–99)
HDLC SERPL-MCNC: 54 MG/DL
LDLC SERPL CALC-MCNC: 106 MG/DL
MICROALBUMIN UR-MCNC: 7 MG/L
MICROALBUMIN/CREAT UR: 12.73 MG/G CR (ref 0–25)
NONHDLC SERPL-MCNC: 130 MG/DL
POTASSIUM SERPL-SCNC: 4 MMOL/L (ref 3.4–5.3)
PROT SERPL-MCNC: 7.7 G/DL (ref 6.8–8.8)
SODIUM SERPL-SCNC: 135 MMOL/L (ref 133–144)
T4 FREE SERPL-MCNC: 1.26 NG/DL (ref 0.76–1.46)
TRIGL SERPL-MCNC: 122 MG/DL
TSH SERPL DL<=0.005 MIU/L-ACNC: 4.1 MU/L (ref 0.4–4)

## 2018-11-29 PROCEDURE — 84439 ASSAY OF FREE THYROXINE: CPT | Performed by: FAMILY MEDICINE

## 2018-11-29 PROCEDURE — 36415 COLL VENOUS BLD VENIPUNCTURE: CPT | Performed by: FAMILY MEDICINE

## 2018-11-29 PROCEDURE — 80053 COMPREHEN METABOLIC PANEL: CPT | Performed by: FAMILY MEDICINE

## 2018-11-29 PROCEDURE — 80061 LIPID PANEL: CPT | Performed by: FAMILY MEDICINE

## 2018-11-29 PROCEDURE — 99396 PREV VISIT EST AGE 40-64: CPT | Performed by: FAMILY MEDICINE

## 2018-11-29 PROCEDURE — 82043 UR ALBUMIN QUANTITATIVE: CPT | Performed by: FAMILY MEDICINE

## 2018-11-29 PROCEDURE — 84443 ASSAY THYROID STIM HORMONE: CPT | Performed by: FAMILY MEDICINE

## 2018-11-29 RX ORDER — LEVOTHYROXINE SODIUM 100 UG/1
TABLET ORAL
Qty: 30 TABLET | Refills: 0 | Status: CANCELLED | OUTPATIENT
Start: 2018-11-29

## 2018-11-29 RX ORDER — LOSARTAN POTASSIUM AND HYDROCHLOROTHIAZIDE 12.5; 5 MG/1; MG/1
TABLET ORAL
Refills: 2 | COMMUNITY
Start: 2018-10-09 | End: 2018-11-29

## 2018-11-29 RX ORDER — ESTRADIOL 10 UG/1
10 INSERT VAGINAL
Qty: 24 TABLET | Refills: 3 | Status: SHIPPED | OUTPATIENT
Start: 2018-11-29 | End: 2023-08-24

## 2018-11-29 RX ORDER — LOSARTAN POTASSIUM AND HYDROCHLOROTHIAZIDE 12.5; 5 MG/1; MG/1
1 TABLET ORAL DAILY
Qty: 90 TABLET | Refills: 3 | Status: SHIPPED | OUTPATIENT
Start: 2018-11-29 | End: 2019-12-29

## 2018-11-29 RX ORDER — FOLIC ACID/MULTIVIT,IRON,MINER 0.4MG-18MG
TABLET ORAL
COMMUNITY

## 2018-11-29 NOTE — MR AVS SNAPSHOT
After Visit Summary   11/29/2018    Claudia Ellison    MRN: [id number]           Patient Information     Date Of Birth          1960        Visit Information        Provider Department      11/29/2018 7:40 AM Ana Bell, DO [facility]        Today's Diagnoses     Routine history and physical examination of adult    -  1    Acquired hypothyroidism        Essential hypertension, benign        Vaginal atrophy        Colon cancer screening          Care Instructions      Preventive Health Recommendations  Female Ages 50 - 64    Yearly exam: See your health care provider every year in order to  o Review health changes.   o Discuss preventive care.    o Review your medicines if your doctor has prescribed any.      Get a Pap test every three years (unless you have an abnormal result and your provider advises testing more often).    If you get Pap tests with HPV test, you only need to test every 5 years, unless you have an abnormal result.     You do not need a Pap test if your uterus was removed (hysterectomy) and you have not had cancer.    You should be tested each year for STDs (sexually transmitted diseases) if you're at risk.     Have a mammogram every 1 to 2 years.    Have a colonoscopy at age 50, or have a yearly FIT test (stool test). These exams screen for colon cancer.      Have a cholesterol test every 5 years, or more often if advised.    Have a diabetes test (fasting glucose) every three years. If you are at risk for diabetes, you should have this test more often.     If you are at risk for osteoporosis (brittle bone disease), think about having a bone density scan (DEXA).    Shots: Get a flu shot each year. Get a tetanus shot every 10 years.    Nutrition:     Eat at least 5 servings of fruits and vegetables each day.    Eat whole-grain bread, whole-wheat pasta and brown rice instead of white grains and rice.    Get adequate Calcium and Vitamin D.      Lifestyle    Exercise at least 150 minutes a week (30 minutes a day, 5 days a week). This will help you control your weight and prevent disease.    Limit alcohol to one drink per day.    No smoking.     Wear sunscreen to prevent skin cancer.     See your dentist every six months for an exam and cleaning.    See your eye doctor every 1 to 2 years.            Follow-ups after your visit        Additional Services     GASTROENTEROLOGY ADULT REF PROCEDURE ONLY [place] [phone]       Last Lab Result: Creatinine (mg/dL)       Date                     Value                 10/06/2017               0.82             ----------  Body mass index is 26.63 kg/(m^2).     Needed:  No  Language:  English    Patient will be contacted to schedule procedure.     Please be aware that coverage of these services is subject to the terms and limitations of your health insurance plan.  Call member services at your health plan with any benefit or coverage questions.  Any procedures must be performed at a [facility] facility OR coordinated by your clinic's referral office.    Please bring the following with you to your appointment:    (1) Any X-Rays, CTs or MRIs which have been performed.  Contact the facility where they were done to arrange for  prior to your scheduled appointment.    (2) List of current medications   (3) This referral request   (4) Any documents/labs given to you for this referral                  Follow-up notes from your care team     Return in about 1 year (around 11/29/2019) for Physical Exam.      Who to contact     If you have questions or need follow up information about today's clinic visit or your schedule please contact [facility] directly at [phone].  Normal or non-critical lab and imaging results will be communicated to you by MyChart, letter or phone within 4 business days after the clinic has received the results. If you do not hear from us within 7  "days, please contact the clinic through [employer] or phone. If you have a critical or abnormal lab result, we will notify you by phone as soon as possible.  Submit refill requests through [employer] or call your pharmacy and they will forward the refill request to us. Please allow 3 business days for your refill to be completed.          Additional Information About Your Visit        [facility] Information     [employer] gives you secure access to your electronic health record. If you see a primary care provider, you can also send messages to your care team and make appointments. If you have questions, please call your primary care clinic.  If you do not have a primary care provider, please call [phone] and they will assist you.        Care EveryWhere ID     This is your Care EveryWhere ID. This could be used by other organizations to access your [facility] medical records  [id number]        Your Vitals Were     Pulse Temperature Respirations Height Pulse Oximetry BMI (Body Mass Index)    90 99.7  F (37.6  C) (Oral) 12 5' 5\" (1.651 m) 96% 26.63 kg/m2       Blood Pressure from Last 3 Encounters:   11/29/18 124/80   12/29/17 128/80   10/06/17 (!) 138/94    Weight from Last 3 Encounters:   11/29/18 160 lb (72.6 kg)   12/29/17 155 lb (70.3 kg)   10/06/17 158 lb (71.7 kg)              We Performed the Following     Albumin Random Urine Quantitative with Creat Ratio     Comprehensive metabolic panel     GASTROENTEROLOGY ADULT REF PROCEDURE ONLY [place] [phone]     Lipid panel reflex to direct LDL Fasting     TSH WITH FREE T4 REFLEX          Today's Medication Changes          These changes are accurate as of 11/29/18  8:01 AM.  If you have any questions, ask your nurse or doctor.               These medicines have changed or have updated prescriptions.        Dose/Directions    estradiol 10 MCG Tabs vaginal tablet   Commonly known as:  VAGIFEM   This may have changed:  when to take this   Used for:  Vaginal " atrophy   Changed by:  Ana Bell DO        Dose:  10 mcg   Place 1 tablet (10 mcg) vaginally twice a week   Quantity:  24 tablet   Refills:  3       levothyroxine 100 MCG tablet   Commonly known as:  SYNTHROID/LEVOTHROID   This may have changed:  Another medication with the same name was removed. Continue taking this medication, and follow the directions you see here.   Used for:  Acquired hypothyroidism   Changed by:  Ana Bell DO        Take 1 tablet (100 mcg) by mouth daily   Quantity:  30 tablet   Refills:  0       losartan-hydrochlorothiazide 50-12.5 MG per tablet   Commonly known as:  HYZAAR   This may have changed:    - how much to take  - how to take this  - when to take this  - Another medication with the same name was removed. Continue taking this medication, and follow the directions you see here.   Used for:  Essential hypertension, benign   Changed by:  Ana Bell DO        Dose:  1 tablet   Take 1 tablet by mouth daily   Quantity:  90 tablet   Refills:  3            Where to get your medicines      These medications were sent to [id number] - [place], [place] - [address]  [address] [address]     Phone:  [phone]     estradiol 10 MCG Tabs vaginal tablet    losartan-hydrochlorothiazide 50-12.5 MG per tablet                Primary Care Provider Office Phone # Fax #    Ana Bell [phone] [address] [address]        Equal Access to Services     [facility]: Hadii ap barros hadmayrao Socrista, waaxda luqadaha, qaybta kaalmada adeegyada, blu todd. So [facility] [phone].    ATENCIÓN: Si habla español, tiene a sanchez disposición servicios gratuitos de asistencia lingüística. Jeanetteame al [phone].    We comply with applicable federal civil rights laws and [place] laws. We do not discriminate on the basis of race, color, national origin, age, disability, sex, sexual  orientation, or gender identity.            Thank you!     Thank you for choosing [facility]  for your care. Our goal is always to provide you with excellent care. Hearing back from our patients is one way we can continue to improve our services. Please take a few minutes to complete the written survey that you may receive in the mail after your visit with us. Thank you!             Your Updated Medication List - Protect others around you: Learn how to safely use, store and throw away your medicines at www.disposemymeds.org.          This list is accurate as of 11/29/18  8:01 AM.  Always use your most recent med list.                   Brand Name Dispense Instructions for use Diagnosis    Biotin 5000 MCG Caps     30 capsule    Take 1 tablet by mouth daily        CHELATED MAGNESIUM PO      Take 250 mg by mouth        cholecalciferol 5000 units (125 mcg) capsule    VITAMIN D3    15 capsule    Take 1 capsule (5,000 Units) by mouth every other day        estradiol 10 MCG Tabs vaginal tablet    VAGIFEM    24 tablet    Place 1 tablet (10 mcg) vaginally twice a week    Vaginal atrophy       Krill Oil 350 MG Caps           levothyroxine 100 MCG tablet    SYNTHROID/LEVOTHROID    30 tablet    Take 1 tablet (100 mcg) by mouth daily    Acquired hypothyroidism       losartan-hydrochlorothiazide 50-12.5 MG per tablet    HYZAAR    90 tablet    Take 1 tablet by mouth daily    Essential hypertension, benign       LUTEIN 20 Caps     30 capsule    Take 1 tablet by mouth daily Reported on 3/23/2017        Lutein-Zeaxanthin 25-5 MG Caps           OMEGA-3 FISH OIL PO           order for DME     1 each    Equipment being ordered: Automatic blood pressure cuff    Essential hypertension, benign       UNABLE TO FIND      Reported on 3/23/2017        VITAMIN K2 PO

## 2018-11-29 NOTE — PROGRESS NOTES
"  SUBJECTIVE:   Claudia Ellison is a 58 year old female who presents to clinic today for the following health issues:      History of Present Illness     Hypertension:     Outpatient blood pressures:  Are being checked    Blood pressures checked at:  Home    Dietary sodium intake::  Low salt diet    {additional problems for roomer to add, delete if none:208451}  Problem list and histories reviewed & adjusted, as indicated.  Additional history: {NONE - AS DOCUMENTED:350842::\"as documented\"}    {ACUTE Problem SUPERLIST - brief histories:092153}    {HIST REVIEW/ LINKS 2:966235}    {PROVIDER CHARTING PREFERENCE:177229}  "

## 2018-11-29 NOTE — Clinical Note
Mammogram done on this date: 7/25/18 (approximately), by this group: [facility] imaging [facility], results were normal.  Pap smear done on this date: 7/25/18 (approximately), by this group: [facility], results were normal.

## 2018-11-29 NOTE — PROGRESS NOTES
SUBJECTIVE:   CC: Claudia Ellison is an 58 year old woman who presents for preventive health visit.     Healthy Habits:    Do you get at least three servings of calcium containing foods daily (dairy, green leafy vegetables, etc.)? yes    Amount of exercise or daily activities, outside of work: every day(s) per week    Problems taking medications regularly No    Medication side effects: No    Have you had an eye exam in the past two years? yes    Do you see a dentist twice per year? yes    Do you have sleep apnea, excessive snoring or daytime drowsiness?no  Mammogram done on this date: 7/25/18 (approximately), by this group: [facility] imaging [facility], results were normal.   Pap smear done on this date: 7/25/18 (approximately), by this group: [facility], results were normal.             Today's PHQ-2 Score:   PHQ-2 ( 1999 [employer]) 11/29/2018 10/6/2017   Q1: Little interest or pleasure in doing things 0 0   Q2: Feeling down, depressed or hopeless 0 0   PHQ-2 Score 0 0   Q1: Little interest or pleasure in doing things - -   Q2: Feeling down, depressed or hopeless - -   PHQ-2 Score - -       Abuse: Current or Past(Physical, Sexual or Emotional)- No  Do you feel safe in your environment? Yes    Social History   Substance Use Topics     Smoking status: Never Smoker     Smokeless tobacco: Never Used     Alcohol use Yes      Comment: Once a week or less     If you drink alcohol do you typically have >3 drinks per day or >7 drinks per week? No                     Reviewed orders with patient.  Reviewed health maintenance and updated orders accordingly - Yes  Patient Active Problem List   Diagnosis     Lateral epicondylitis     Female climacteric     CARDIOVASCULAR SCREENING; LDL GOAL LESS THAN 160     Tubular adenoma of colon     Microalbuminuria     Essential hypertension, benign     Acquired hypothyroidism     Past Surgical History:   Procedure Laterality Date     BIOPSY      Mother     CARDIAC SURGERY      Mother      CHOLECYSTECTOMY       COLONOSCOPY      Myself     SOFT TISSUE SURGERY      Myself       Social History   Substance Use Topics     Smoking status: Never Smoker     Smokeless tobacco: Never Used     Alcohol use Yes      Comment: Once a week or less     Family History   Problem Relation Age of Onset     Respiratory Mother      Asthma     Eye Disorder Mother      macular degeneration     Lipids Mother      Hypertension Mother      Other Cancer Mother      Lung     Asthma Mother      Osteoporosis Mother      Musculoskeletal Disorder Father      ALS;      Depression Son      Lipids Brother      hypercholestermia     Hypertension Maternal Half-Brother      Hyperlipidemia Maternal Half-Brother      Cerebrovascular Disease Maternal Half-Brother            Mammogram Screening: Patient over age 50, mutual decision to screen reflected in health maintenance.    Pertinent mammograms are reviewed under the imaging tab.  History of abnormal Pap smear: NO - age 30-65 PAP every 5 years with negative HPV co-testing recommended     Reviewed and updated as needed this visit by clinical staff  Tobacco  Allergies  Meds  Med Hx  Surg Hx  Fam Hx  Soc Hx        Reviewed and updated as needed this visit by Provider            ROS:  CONSTITUTIONAL: NEGATIVE for fever, chills, change in weight  INTEGUMENTARY/SKIN: NEGATIVE for worrisome rashes, moles or lesions  EYES: NEGATIVE for vision changes or irritation  ENT: NEGATIVE for ear, mouth and throat problems  RESP: NEGATIVE for significant cough or SOB  BREAST: NEGATIVE for masses, tenderness or discharge  CV: NEGATIVE for chest pain, palpitations or peripheral edema  GI: NEGATIVE for nausea, abdominal pain, heartburn, or change in bowel habits  : NEGATIVE for unusual urinary or vaginal symptoms. No vaginal bleeding.  MUSCULOSKELETAL: NEGATIVE for significant arthralgias or myalgia  NEURO: NEGATIVE for weakness, dizziness or paresthesias  PSYCHIATRIC: NEGATIVE for changes in  "mood or affect     OBJECTIVE:   /80 (BP Location: Right arm, Patient Position: Chair, Cuff Size: Adult Regular)  Pulse 90  Temp 99.7  F (37.6  C) (Oral)  Resp 12  Ht 5' 5\" (1.651 m)  Wt 160 lb (72.6 kg)  SpO2 96%  BMI 26.63 kg/m2  EXAM:  GENERAL: healthy, alert and no distress  EYES: Eyes grossly normal to inspection, PERRL and conjunctivae and sclerae normal  HENT: ear canals and TM's normal, nose and mouth without ulcers or lesions  NECK: no adenopathy, no asymmetry, masses, or scars and thyroid normal to palpation  RESP: lungs clear to auscultation - no rales, rhonchi or wheezes  CV: regular rate and rhythm, normal S1 S2, no S3 or S4, no murmur, click or rub, no peripheral edema and peripheral pulses strong  ABDOMEN: soft, nontender, no hepatosplenomegaly, no masses and bowel sounds normal  MS: no gross musculoskeletal defects noted, no edema  SKIN: no suspicious lesions or rashes  NEURO: Normal strength and tone, mentation intact and speech normal  PSYCH: mentation appears normal, affect normal/bright    ASSESSMENT/PLAN:   1. Routine history and physical examination of adult  - Lipid panel reflex to direct LDL Fasting    2. Acquired hypothyroidism  - Patient will need refills once labs are back   - TSH WITH FREE T4 REFLEX    3. Essential hypertension, benign  - Well controlled, CPM  - losartan-hydrochlorothiazide (HYZAAR) 50-12.5 MG per tablet; Take 1 tablet by mouth daily  Dispense: 90 tablet; Refill: 3  - Comprehensive metabolic panel  - Albumin Random Urine Quantitative with Creat Ratio    4. Vaginal atrophy  - estradiol (VAGIFEM) 10 MCG TABS vaginal tablet; Place 1 tablet (10 mcg) vaginally twice a week  Dispense: 24 tablet; Refill: 3    5. Colon cancer screening  - GASTROENTEROLOGY ADULT REF PROCEDURE ONLY [place] [phone]    COUNSELING:   Reviewed preventive health counseling, as reflected in patient instructions    BP Readings from Last 1 Encounters:   11/29/18 124/80 " "    Estimated body mass index is 26.63 kg/(m^2) as calculated from the following:    Height as of this encounter: 5' 5\" (1.651 m).    Weight as of this encounter: 160 lb (72.6 kg).      Weight management plan: Discussed healthy diet and exercise guidelines     reports that she has never smoked. She has never used smokeless tobacco.      Counseling Resources:  ATP IV Guidelines  Pooled Cohorts Equation Calculator  Breast Cancer Risk Calculator  FRAX Risk Assessment  ICSI Preventive Guidelines  Dietary Guidelines for Americans, 2010  USDA's MyPlate  ASA Prophylaxis  Lung CA Screening    Ana Bell DO  [facility]  Answers for HPI/ROS submitted by the patient on 11/29/2018   Chronic problems general questions HPI Form  Outpatient blood pressures: are being checked  Dietary sodium intake:: Low salt diet  Blood pressures checked at: Home    "

## 2018-12-03 RX ORDER — LEVOTHYROXINE SODIUM 100 UG/1
TABLET ORAL
Qty: 90 TABLET | Refills: 3 | Status: SHIPPED | OUTPATIENT
Start: 2018-12-03 | End: 2019-12-29

## 2019-08-28 ENCOUNTER — TRANSFERRED RECORDS (OUTPATIENT)
Dept: HEALTH INFORMATION MANAGEMENT | Facility: CLINIC | Age: 59
End: 2019-08-28

## 2019-08-28 LAB — PAP SMEAR - HIM PATIENT REPORTED: NEGATIVE

## 2019-09-27 ENCOUNTER — HEALTH MAINTENANCE LETTER (OUTPATIENT)
Age: 59
End: 2019-09-27

## 2019-10-22 ENCOUNTER — HOSPITAL ENCOUNTER (OUTPATIENT)
Facility: CLINIC | Age: 59
Discharge: HOME | End: 2019-10-22
Attending: INTERNAL MEDICINE | Admitting: INTERNAL MEDICINE
Payer: COMMERCIAL

## 2019-10-22 VITALS
BODY MASS INDEX: 25.39 KG/M2 | HEIGHT: 66 IN | DIASTOLIC BLOOD PRESSURE: 82 MMHG | WEIGHT: 158 LBS | OXYGEN SATURATION: 94 % | SYSTOLIC BLOOD PRESSURE: 119 MMHG | HEART RATE: 79 BPM | RESPIRATION RATE: 12 BRPM

## 2019-10-22 LAB — COLONOSCOPY: NORMAL

## 2019-10-22 PROCEDURE — 45378 DIAGNOSTIC COLONOSCOPY: CPT | Performed by: INTERNAL MEDICINE

## 2019-10-22 PROCEDURE — G0500 MOD SEDAT ENDO SERVICE >5YRS: HCPCS | Performed by: INTERNAL MEDICINE

## 2019-10-22 PROCEDURE — 25000128 H RX IP 250 OP 636: Performed by: INTERNAL MEDICINE

## 2019-10-22 PROCEDURE — G0105 COLORECTAL SCRN; HI RISK IND: HCPCS | Performed by: INTERNAL MEDICINE

## 2019-10-22 RX ORDER — ONDANSETRON 2 MG/ML
4 INJECTION INTRAMUSCULAR; INTRAVENOUS EVERY 6 HOURS PRN
Status: DISCONTINUED | OUTPATIENT
Start: 2019-10-22 | End: 2019-10-22 | Stop reason: HOSPADM

## 2019-10-22 RX ORDER — ONDANSETRON 4 MG/1
4 TABLET, ORALLY DISINTEGRATING ORAL EVERY 6 HOURS PRN
Status: DISCONTINUED | OUTPATIENT
Start: 2019-10-22 | End: 2019-10-22 | Stop reason: HOSPADM

## 2019-10-22 RX ORDER — NALOXONE HYDROCHLORIDE 0.4 MG/ML
.1-.4 INJECTION, SOLUTION INTRAMUSCULAR; INTRAVENOUS; SUBCUTANEOUS
Status: DISCONTINUED | OUTPATIENT
Start: 2019-10-22 | End: 2019-10-22 | Stop reason: HOSPADM

## 2019-10-22 RX ORDER — FENTANYL CITRATE 50 UG/ML
INJECTION, SOLUTION INTRAMUSCULAR; INTRAVENOUS PRN
Status: DISCONTINUED | OUTPATIENT
Start: 2019-10-22 | End: 2019-10-22 | Stop reason: HOSPADM

## 2019-10-22 RX ORDER — FLUMAZENIL 0.1 MG/ML
0.2 INJECTION, SOLUTION INTRAVENOUS
Status: DISCONTINUED | OUTPATIENT
Start: 2019-10-22 | End: 2019-10-22 | Stop reason: HOSPADM

## 2019-10-22 ASSESSMENT — MIFFLIN-ST. JEOR: SCORE: 1300.49

## 2019-10-22 NOTE — H&P
Pre-Endoscopy History and Physical     Claudia Ellison MRN# [id number]   YOB: 1960 Age: 59 year old     Date of Procedure: 10/22/2019  Primary care provider: Hector Dorado  Type of Endoscopy: Colonoscopy with possible biopsy, possible polypectomy  Reason for Procedure: screen  Type of Anesthesia Anticipated: Conscious Sedation    HPI:    Claudia is a 59 year old female who will be undergoing the above procedure.      A history and physical has been performed. The patient's medications and allergies have been reviewed. The risks and benefits of the procedure and the sedation options and risks were discussed with the patient.  All questions were answered and informed consent was obtained.      She denies a personal or family history of anesthesia complications or bleeding disorders.     Patient Active Problem List   Diagnosis     Lateral epicondylitis     Female climacteric     CARDIOVASCULAR SCREENING; LDL GOAL LESS THAN 160     Tubular adenoma of colon     Microalbuminuria     Essential hypertension, benign     Acquired hypothyroidism        Past Medical History:   Diagnosis Date     CARDIOVASCULAR SCREENING; LDL GOAL LESS THAN 160 10/31/2010     Essential hypertension, benign 12/16/2015     History of blood transfusion At birth     HYPOTHYROIDISM NOS 6/4/2007     Lateral epicondylitis 4/26/2007     Microalbuminuria 10/10/2014     Vega's neuroma      Plantar fasciitis      Tubular adenoma of colon         Past Surgical History:   Procedure Laterality Date     BIOPSY      Mother     CARDIAC SURGERY      Mother     CHOLECYSTECTOMY       COLONOSCOPY      Myself     COLONOSCOPY  10/22/2019    Dr. Edmond SHIRLEY     SOFT TISSUE SURGERY      Myself       Social History     Tobacco Use     Smoking status: Never Smoker     Smokeless tobacco: Never Used   Substance Use Topics     Alcohol use: Yes     Comment: Once a week or less       Family History   Problem Relation Age of Onset     Respiratory  Mother         Asthma     Eye Disorder Mother         macular degeneration     Lipids Mother      Hypertension Mother      Other Cancer Mother         Lung     Asthma Mother      Osteoporosis Mother      Musculoskeletal Disorder Father         ALS;      Depression Son      Lipids Brother         hypercholestermia     Hypertension Maternal Half-Brother      Hyperlipidemia Maternal Half-Brother      Cerebrovascular Disease Maternal Half-Brother        Prior to Admission medications    Medication Sig Start Date End Date Taking? Authorizing Provider   Biotin 5000 MCG CAPS Take 1 tablet by mouth daily 10/15/14  Yes Hector Dorado MD   CHELATED MAGNESIUM PO Take 250 mg by mouth   Yes Reported, Patient   cholecalciferol (VITAMIN D3) 5000 UNITS CAPS capsule Take 1 capsule (5,000 Units) by mouth every other day 10/15/14  Yes Hector Dorado MD   estradiol (VAGIFEM) 10 MCG TABS vaginal tablet Place 1 tablet (10 mcg) vaginally twice a week 18  Yes Ana Bell DO   Krill Oil 350 MG CAPS    Yes Reported, Patient   levothyroxine (SYNTHROID/LEVOTHROID) 100 MCG tablet Take 1 tablet (100 mcg) by mouth daily 12/3/18  Yes Ana Bell DO   losartan-hydrochlorothiazide (HYZAAR) 50-12.5 MG per tablet Take 1 tablet by mouth daily 18  Yes Ana Bell DO   Lutein-Zeaxanthin 25-5 MG CAPS    Yes Reported, Patient   Menaquinone-7 (VITAMIN K2 PO)    Yes Reported, Patient   Misc Natural Products (LUTEIN 20) CAPS Take 1 tablet by mouth daily Reported on 3/23/2017 10/15/14  Yes Hector Dorado MD   Omega-3 Fatty Acids (OMEGA-3 FISH OIL PO)    Yes Reported, Patient   order for DME Equipment being ordered: Automatic blood pressure cuff 3/23/17   Ana Bell,    UNABLE TO FIND Reported on 3/23/2017    Reported, Patient       Allergies   Allergen Reactions     No Known Drug Allergies         REVIEW OF SYSTEMS:   5 point ROS negative except as noted above in HPI, including  "Gen., Resp., CV, GI &  system review.    PHYSICAL EXAM:   Ht 1.664 m (5' 5.5\")   Wt 71.7 kg (158 lb)   BMI 25.89 kg/m   Estimated body mass index is 25.89 kg/m  as calculated from the following:    Height as of this encounter: 1.664 m (5' 5.5\").    Weight as of this encounter: 71.7 kg (158 lb).   GENERAL APPEARANCE: alert, and oriented  MENTAL STATUS: alert  AIRWAY EXAM: Mallampatti Class I (visualization of the soft palate, fauces, uvula, anterior and posterior pillars)  RESP: lungs clear to auscultation - no rales, rhonchi or wheezes  CV: regular rates and rhythm  DIAGNOSTICS:    Not indicated    IMPRESSION   ASA Class 1 - Healthy patient, no medical problems    PLAN:   Plan for Colonoscopy with possible biopsy, possible polypectomy. We discussed the risks, benefits and alternatives and the patient wished to proceed.    The above has been forwarded to the consulting provider.      Signed Electronically by: Juan Pruitt MD  October 22, 2019          "

## 2019-10-22 NOTE — LETTER
September 27, 2019    Claudia Ellison  [address] [place] [id number]    Dear Claudia,     Thank you for choosing [facility]. You are scheduled for the following service(s).   Please be aware that coverage of these services is subject to the terms and limitations of your health insurance plan.  Call member services at your health plan with any benefit or coverage questions.    Date:  October 22, 2019             Procedure:  COLONOSCOPY  Doctor:        Dr. Pruitt  Arrival Time:  0830 *Check in at Emergency/Endoscopy desk*  Procedure Time:  0900    Location:   [facility]        Endoscopy Department, First Floor (Enter through ER Doors) *        [address] [place], [place] [address] [phone] or [phone] () to reschedule      MIRALAX -GATORADE  PREP  Colonoscopy is the most accurate test to detect colon polyps and colon cancer; and the only test where polyps can be removed. During this procedure, a doctor examines the lining of your large intestine and rectum through a flexible tube.     Transportation  You must arrange for a ride for the day of your procedure with a responsible adult. A taxi , Uber, etc, is not an option unless you are accompanied by a responsible adult. If you fail to arrange transportation with a responsible adult, your procedure will be cancelled and rescheduled.    Purchase the  following supplies at your local pharmacy:  - 2 (two) bisacodyl tablets: each tablet contains 5 mg.  (Dulcolax  laxative NOT Dulcolax  stool softener)   - 1 (one) 8.3 oz bottle of Polyethylene Glycol (PEG) 3350 Powder   (MiraLAX , Smooth LAX , ClearLAX  or equivalent)  - 64 oz Gatorade    Regular Gatorade, Gatorade G2 , Powerade , Powerade Zero  or Pedialyte  is acceptable. Red colored flavors are not allowed; all other colors (yellow, green, orange, purple and blue) are okay. It is also okay to buy two 2.12 oz packets of powdered  Gatorade that can be mixed with water to a total volume of 64 oz of liquid.  - 1 (one) 10 oz bottle of Magnesium Citrate (Red colored flavors are not allowed)  It is also okay for you to use a 0.5 oz package of powdered magnesium citrate (17 g) mixed with 10 oz of water.  PREPARATION FOR COLONOSCOPY  7 days before:    Discontinue fiber supplements and medications containing iron. This includes Metamucil  and Fibercon ; and multivitamins with iron.  3 days before:    Begin a low-fiber diet. A low-fiber diet helps making the cleanout more effective.     Examples of a low-fiber diet include (but are not limited to): white bread, white rice, pasta, crackers, fish, chicken, eggs, ground beef, creamy peanut butter, cooked/steamed/boiled vegetables, canned fruit, bananas, melons, milk, plain yogurt cheese, salad dressing and other condiments.         The following are not allowed on a low-fiber diet: seeds, nuts, popcorn, bran, whole wheat, corn, quinoa, raw fruits and vegetables, berries and dried fruit, beans and lentils.    For additional details on low-fiber diet, please refer to the table on the last page.    2 days before:    Continue the low-fiber diet.     Drink at least 8 glasses of water throughout the day.     Stop eating solid foods at 11:45 pm.  1. 1 day before:    In the morning: begin a clear liquid diet (liquids you can see through).     Examples of a clear liquid diet include: water, clear broth or bouillon, Gatorade, Pedialyte or Powerade, carbonated and non-carbonated soft drinks (Sprite , 7-Up , ginger ale), strained fruit juices without pulp (apple, white grape, white cranberry), Jell-O  and popsicles.     The following are not allowed on a clear liquid diet: red liquids, alcoholic beverages, dairy products (milk, creamer, and yogurt), protein shakes, creamy broths, juice with pulp and chewing tobacco.    At noon: take 2 (two) bisacodyl tablets     At 4 (and no later than 6pm): start drinking the  Miralax-Gatorade preparation (8.3 oz of Miralax mixed with 64 oz of Gatorade in a large pitcher). Drink 1(one) 8 oz glass every 15 minutes thereafter, until the mixture is gone.    COLON CLEANSING TIPS: drink adequate amounts of fluids before and after your colon cleansing to prevent dehydration. Stay near a toilet because you will have diarrhea. Even if you are sitting on the toilet, continue to drink the cleansing solution every 15 minutes. If you feel nauseous or vomit, rinse your mouth with water, take a 15 to 30-minute-break and then continue drinking the solution. You will be uncomfortable until the stool has flushed from your colon (in about 2 to 4 hours). You may feel chilled.    Day of your procedure  You may take all of your morning medications including blood pressure medications, blood thinners (if you have not been instructed to stop these by our office), methadone, anti-seizure medications with sips of water 3 hours prior to your procedure or earlier. Do not take insulin or vitamins prior to your procedure. Continue the clear liquid diet.       4 hours prior: drink 10 oz of magnesium citrate. It may be easier to drink it with a straw.    STOP consuming all liquids after that.     Do not take anything by mouth during this time.     Allow extra time to travel to your procedure as you may need to stop and use a restroom along the way.    You are ready for the procedure, if you followed all instructions and your stool is no longer formed, but clear or yellow liquid. If you are unsure whether your colon is clean, please call our office at [phone] before you leave for your appointment.    Bring the following to your procedure:  - Insurance Card/Photo ID.   - List of current medications including over-the-counter medications and supplements.   - Your rescue inhaler if you currently use one to control asthma.      Canceling or rescheduling your appointment:   If you must cancel or reschedule your  appointment, please call [phone] as soon as possible.        COLONOSCOPY PRE-PROCEDURE CHECKLIST  If you have diabetes, ask your regular doctor for diet and medication restrictions.  If you take an anticoagulant or anti-platelet medication (such as Coumadin , Lovenox , Pradaxa , Xarelto , Eliquis , etc.), please call your primary doctor for advice on holding this medication.  If you take aspirin you may continue to do so.  If you are or may be pregnant, please discuss the risks and benefits of this procedure with your doctor.      What happens during a colonoscopy?    Plan to spend up to two hours, starting at registration time, at the endoscopy center the day of your procedure. The colonoscopy takes an average of 15 to 30 minutes. Recovery time is about 30 minutes.      Before the exam:    You will change into a gown.    Your medical history and medication list will be reviewed with you, unless that has been done over the phone prior to the procedure.     A nurse will insert an intravenous (IV) line into your hand or arm.    The doctor will meet with you and will give you a consent form to sign.  1. During the exam:     Medicine will be given through the IV line to help you relax.     Your heart rate and oxygen levels will be monitored. If your blood pressure is low, you may be given fluids through the IV line.     The doctor will insert a flexible hollow tube, called a colonoscope, into your rectum. The scope will be advanced slowly through the large intestine (colon).    You may have a feeling of fullness or pressure.     If an abnormal tissue or a polyp is found, the doctor may remove it through the endoscope for closer examination, or biopsy. Tissue removal is painless    After the exam:           Any tissue samples removed during the exam will be sent to a lab for evaluation. It may take 5-7 working days for you to be notified of the results.     A nurse will provide you with complete discharge  instructions before you leave the endoscopy center. Be sure to ask the nurse for specific instructions if you take blood thinners such as Aspirin, Coumadin or Plavix.     The doctor will prepare a full report for you and for the physician who referred you for the procedure.     Your doctor will talk with you about the initial results of your exam.      Medication given during the exam will prohibit you from driving for the rest of the day.     Following the exam, you may resume your normal diet. Your first meal should be light, no greasy foods. Avoid alcohol until the next day.     You may resume your regular activities the day after the procedure.         LOW-FIBER DIET    Foods RECOMMENDED Foods to AVOID   Breads, Cereal, Rice and Pasta:   White bread, rolls, biscuits, croissant and toya toast.   Waffles, Greek toast and pancakes.   White rice, noodles, pasta, macaroni and peeled cooked potatoes.   Plain crackers and saltines.   Cooked cereals: farina, cream of rice.   Cold cereals: Puffed Rice , Rice Krispies , Corn Flakes  and Special K    Breads, Cereal, Rice and Pasta:   Breads or rolls with nuts, seeds or fruit.   Whole wheat, pumpernickel, rye breads and cornbread.   Potatoes with skin, brown or wild rice, and kasha (buckwheat).     Vegetables:   Tender cooked and canned vegetables without seeds: carrots, asparagus tips, green or wax beans, pumpkin, spinach, lima beans. Vegetables:   Raw or steamed vegetables.   Vegetables with seeds.   Sauerkraut.   Winter squash, peas, broccoli, Brussel sprouts, cabbage, onions, cauliflower, baked beans, peas and corn.   Fruits:   Strained fruit juice.   Canned fruit, except pineapple.   Ripe bananas and melon. Fruits:   Prunes and prune juice.   Raw fruits.   Dried fruits: figs, dates and raisins.   Milk/Dairy:   Milk: plain or flavored.   Yogurt, custard and ice cream.   Cheese and cottage cheese Milk/Dairy:     Meat and other proteins:   ground, well-cooked tender  beef, lamb, ham, veal, pork, fish, poultry and organ meats.   Eggs.   Peanut butter without nuts. Meat and other proteins:   Tough, fibrous meats with gristle.   Dry beans, peas and lentils.   Peanut butter with nuts.   Tofu.   Fats, Snack, Sweets, Condiments and Beverages:   Margarine, butter, oils, mayonnaise, sour cream and salad dressing, plain gravy.   Sugar, hard candy, clear jelly, honey and syrup.   Spices, cooked herbs, bouillon, broth and soups made with allowed vegetable, ketchup and mustard.   Coffee, tea and carbonated drinks.   Plain cakes, cookies and pretzels.   Gelatin, plain puddings, custard, ice cream, sherbet and popsicles. Fats, Snack, Sweets, Condiments and Beverages:   Nuts, seeds and coconut.   Jam, marmalade and preserves.   Pickles, olives, relish and horseradish.   All desserts containing nuts, seeds, dried fruit and coconut; or made from whole grains or bran.   Candy made with nuts or seeds.   Popcorn.     DIRECTIONS TO THE ENDOSCOPY DEPARTMENT     From the north ([facility])  Take [address] South, exit on [address] [address]. Get into the left hand ho, turn left (east), go one-half mile to [address] and turn left. Go north to the first stoplight, take a right on [address] and follow it to the Emergency entrance.    From the south ([facility])  Take [address]N to the [address]E split and exit on [address] [address]. On [address] [address], turn left (west) to [address]. Turn right (north) on [address]. Go north to the first stoplight, take a right on [address] and follow it to the Emergency entrance.    From the east via 35E ([facility])  Take [address] south to [address] [address] exit. Turn right on [address] [address]. Go west to [address]. Turn right (north) on [address]. Go to the first stoplight, take a right and follow on [address] to the Emergency entrance.    From the east via [address] ([facility])  [address] to [address]. Turn left (south) on [address] to [address]. Turn left (east) on [address] and follow it to the Emergency entrance.    From the west via [address] ([place])  Take [address] east to [address]. Turn right (south) on [address] to [address]. Turn left (east) on [address] and follow it to the Emergency entrance.

## 2019-10-22 NOTE — DISCHARGE INSTRUCTIONS
Understanding Diverticulosis and Diverticulitis     Pouches or diverticula usually occur in the lower part of the colon called the sigmoid.      Diverticulitis occurs when the pouches become inflamed.     The colon (large intestine) is the last part of the digestive tract. It absorbs water from stool and changes it from a liquid to a solid. In certain cases, small pouches called diverticula can form in the colon wall. This condition is called diverticulosis. The pouches can become infected. If this happens, it becomes a more serious problem called diverticulitis. These problems can be painful. But they can be managed.   Managing Your Condition  Diet changes or taking medications are often tried first. These may be enough to bring relief. If the case is bad, surgery may be done. You and your doctor can discuss the plan that is best for you.  If You Have Diverticulosis  Diet changes are often enough to control symptoms. The main changes are adding fiber (roughage) and drinking more water. Fiber absorbs water as it travels through your colon. This helps your stool stay soft and move smoothly. Water helps this process. If needed, you may be told to take over-the-counter stool softeners. To help relieve pain, antispasmodic medications may be prescribed.  If You Have Diverticulitis  Treatment depends on how bad your symptoms are.  For mild symptoms: You may be put on a liquid diet for a short time. You may also be prescribed antibiotics. If these two steps relieve your symptoms, you may then be prescribed a high-fiber diet. If you still have symptoms, your doctor will discuss further treatment options with you.  For severe symptoms: You may need to be admitted to the hospital. There, you can be given IV antibiotics and fluids. Once symptoms are under control, the above treatments may be tried. If these don t control your condition, your doctor may discuss the option of having surgery with you.  [place] to Colon  Health  Help keep your colon healthy with a diet that includes plenty of high-fiber fruits, vegetables, and whole grains. Drink plenty of liquids like water and juice. Your doctor may also recommend avoiding seeds and nuts.          [id number] Tamara Brenner, [address] [place] [address], [place] [address]. All rights reserved. This information is not intended as a substitute for professional medical care. Always follow your healthcare professional's instructions.

## 2019-12-29 ENCOUNTER — MYC REFILL (OUTPATIENT)
Dept: FAMILY MEDICINE | Facility: CLINIC | Age: 59
End: 2019-12-29

## 2019-12-29 DIAGNOSIS — E03.9 ACQUIRED HYPOTHYROIDISM: ICD-10-CM

## 2019-12-29 DIAGNOSIS — I10 ESSENTIAL HYPERTENSION, BENIGN: ICD-10-CM

## 2019-12-29 NOTE — LETTER
[facility]  [id number] [place] [address] [id number] [phone]                                                                                                January 7, 2020      Claudia Ellison  [address] [facility] [id number]        Wolfgang Taylor,        We have attempted to reach you regarding your refill request, but have been unsuccessful.    We have received a refill request for one of your medications. We have sent a refill of your medication to your pharmacy, however your provider has noted that you will need to be seen for a follow up visit in order to continue this medication.    Please contact us at [phone] to schedule an appointment with your provider before your next refill is due.      Thank you,      Your Health Care Team at the [facility]

## 2019-12-30 NOTE — TELEPHONE ENCOUNTER
"Requested Prescriptions   Pending Prescriptions Disp Refills     losartan-hydrochlorothiazide (HYZAAR) 50-12.5 MG tablet  Last Written Prescription Date:  11/29/2018  Last Fill Quantity: 90 tablet,  # refills: 3   Last office visit: 11/29/2018 with prescribing provider:  VERONIKA Bell   Future Office Visit:   90 tablet 3     Sig: Take 1 tablet by mouth daily       Angiotensin-II Receptors Failed - 12/30/2019  7:27 AM        Failed - Recent (12 mo) or future (30 days) visit within the authorizing provider's specialty     Patient has had an office visit with the authorizing provider or a provider within the authorizing providers department within the previous 12 mos or has a future within next 30 days. See \"Patient Info\" tab in inbasket, or \"Choose Columns\" in Meds & Orders section of the refill encounter.              Failed - Normal serum creatinine on file in past 12 months     Recent Labs   Lab Test 11/29/18  0803   CR 0.76             Failed - Normal serum potassium on file in past 12 months     Recent Labs   Lab Test 11/29/18  0803   POTASSIUM 4.0              Passed - Last blood pressure under 140/90 in past 12 months     BP Readings from Last 3 Encounters:   10/22/19 119/82   11/29/18 124/80   12/29/17 128/80             Passed - Medication is active on med list        Passed - Patient is age 18 or older        Passed - No active pregnancy on record        Passed - No positive pregnancy test in past 12 months                  levothyroxine (SYNTHROID/LEVOTHROID) 100 MCG tablet  Last Written Prescription Date:  12/3/2018  Last Fill Quantity: 90 tablet,  # refills: 3   Last office visit: 11/29/2018 with prescribing provider:  VERONIKA Bell   Future Office Visit:   90 tablet 3     Sig: Take 1 tablet (100 mcg) by mouth daily       Thyroid Protocol Failed - 12/30/2019  7:27 AM        Failed - Recent (12 mo) or future (30 days) visit within the authorizing provider's specialty     Patient has had an office visit with the " "authorizing provider or a provider within the authorizing providers department within the previous 12 mos or has a future within next 30 days. See \"Patient Info\" tab in inbasket, or \"Choose Columns\" in Meds & Orders section of the refill encounter.              Failed - Normal TSH on file in past 12 months     Recent Labs   Lab Test 11/29/18  0803   TSH 4.10*              Passed - Patient is 12 years or older        Passed - Medication is active on med list        Passed - No active pregnancy on record     If patient is pregnant or has had a positive pregnancy test, please check TSH.          Passed - No positive pregnancy test in past 12 months     If patient is pregnant or has had a positive pregnancy test, please check TSH.          "

## 2019-12-31 DIAGNOSIS — E03.9 ACQUIRED HYPOTHYROIDISM: ICD-10-CM

## 2019-12-31 DIAGNOSIS — I10 ESSENTIAL HYPERTENSION, BENIGN: ICD-10-CM

## 2019-12-31 RX ORDER — LOSARTAN POTASSIUM AND HYDROCHLOROTHIAZIDE 12.5; 5 MG/1; MG/1
1 TABLET ORAL DAILY
Qty: 30 TABLET | Refills: 0 | Status: SHIPPED | OUTPATIENT
Start: 2019-12-31 | End: 2020-01-20

## 2019-12-31 RX ORDER — LEVOTHYROXINE SODIUM 100 UG/1
TABLET ORAL
Qty: 30 TABLET | Refills: 0 | Status: SHIPPED | OUTPATIENT
Start: 2019-12-31 | End: 2020-01-20

## 2019-12-31 NOTE — TELEPHONE ENCOUNTER
30 day supply signed.  PATIENT needs OV follow up before further refills can be sent.  She previously saw me at the [place] location, so may choose to establish with a new provider instead of coming to [place].

## 2019-12-31 NOTE — TELEPHONE ENCOUNTER
Routing refill request to provider for review/approval because:  Labs not current:  TSH  Patient needs to be seen because it has been more than 1 year since last office visit.

## 2020-01-02 RX ORDER — LOSARTAN POTASSIUM AND HYDROCHLOROTHIAZIDE 12.5; 5 MG/1; MG/1
1 TABLET ORAL DAILY
Qty: 90 TABLET | Refills: 2 | OUTPATIENT
Start: 2020-01-02

## 2020-01-02 RX ORDER — LEVOTHYROXINE SODIUM 100 UG/1
TABLET ORAL
Qty: 90 TABLET | Refills: 2 | OUTPATIENT
Start: 2020-01-02

## 2020-01-02 NOTE — TELEPHONE ENCOUNTER
Left voicemail in regards to msg from provider.    Thank you,  Coleen LISA  [facility]  Team [place] Coordinator

## 2020-01-07 NOTE — TELEPHONE ENCOUNTER
2nd attempt. LMOM for patient to call back main line to schedule an appointment. Letter mailed.    Thank you,  Coleen LISA  [facility]  Team [place] Coordinator

## 2020-01-20 ENCOUNTER — OFFICE VISIT (OUTPATIENT)
Dept: FAMILY MEDICINE | Facility: CLINIC | Age: 60
End: 2020-01-20
Payer: COMMERCIAL

## 2020-01-20 VITALS
WEIGHT: 157 LBS | BODY MASS INDEX: 26.16 KG/M2 | SYSTOLIC BLOOD PRESSURE: 118 MMHG | OXYGEN SATURATION: 98 % | TEMPERATURE: 98.5 F | HEART RATE: 85 BPM | DIASTOLIC BLOOD PRESSURE: 60 MMHG | HEIGHT: 65 IN

## 2020-01-20 DIAGNOSIS — E03.9 ACQUIRED HYPOTHYROIDISM: ICD-10-CM

## 2020-01-20 DIAGNOSIS — I10 ESSENTIAL HYPERTENSION, BENIGN: ICD-10-CM

## 2020-01-20 PROCEDURE — 36415 COLL VENOUS BLD VENIPUNCTURE: CPT | Performed by: FAMILY MEDICINE

## 2020-01-20 PROCEDURE — 99214 OFFICE O/P EST MOD 30 MIN: CPT | Performed by: FAMILY MEDICINE

## 2020-01-20 PROCEDURE — 80053 COMPREHEN METABOLIC PANEL: CPT | Performed by: FAMILY MEDICINE

## 2020-01-20 PROCEDURE — 84439 ASSAY OF FREE THYROXINE: CPT | Performed by: FAMILY MEDICINE

## 2020-01-20 PROCEDURE — 84443 ASSAY THYROID STIM HORMONE: CPT | Performed by: FAMILY MEDICINE

## 2020-01-20 RX ORDER — LOSARTAN POTASSIUM AND HYDROCHLOROTHIAZIDE 12.5; 5 MG/1; MG/1
1 TABLET ORAL DAILY
Qty: 90 TABLET | Refills: 3 | Status: SHIPPED | OUTPATIENT
Start: 2020-01-20 | End: 2021-01-22

## 2020-01-20 RX ORDER — LEVOTHYROXINE SODIUM 100 UG/1
TABLET ORAL
Qty: 90 TABLET | Refills: 3 | Status: SHIPPED | OUTPATIENT
Start: 2020-01-20 | End: 2021-01-15

## 2020-01-20 ASSESSMENT — MIFFLIN-ST. JEOR: SCORE: 1280.09

## 2020-01-20 NOTE — PROGRESS NOTES
"Subjective     Claudia Ellison is a 59 year old female who presents to clinic today for the following health issues:    HPI   Medication Followup of levothyroxine  And Hyzaar    Taking Medication as prescribed: yes    Side Effects:  None    Medication Helping Symptoms:  yes     She's been feeling well, dose of synthroid has been stable     BP Readings from Last 3 Encounters:   01/20/20 118/60   10/22/19 119/82   11/29/18 124/80    Wt Readings from Last 3 Encounters:   01/20/20 71.2 kg (157 lb)   10/22/19 71.7 kg (158 lb)   11/29/18 72.6 kg (160 lb)                    Reviewed and updated as needed this visit by Provider         Review of Systems   ROS COMP: Constitutional, HEENT, cardiovascular, pulmonary, GI, , musculoskeletal, neuro, skin, endocrine and psych systems are negative, except as otherwise noted.      Objective    /60 (BP Location: Right arm, Patient Position: Chair, Cuff Size: Adult Regular)   Pulse 85   Temp 98.5  F (36.9  C) (Oral)   Ht 1.638 m (5' 4.5\")   Wt 71.2 kg (157 lb)   SpO2 98%   BMI 26.53 kg/m    Body mass index is 26.53 kg/m .  Physical Exam   GENERAL: healthy, alert and no distress  EYES: Eyes grossly normal to inspection, PERRL and conjunctivae and sclerae normal  HENT: ear canals and TM's normal, nose and mouth without ulcers or lesions  NECK: no adenopathy, no asymmetry, masses, or scars and thyroid normal to palpation  RESP: lungs clear to auscultation - no rales, rhonchi or wheezes  CV: regular rate and rhythm, normal S1 S2, no S3 or S4, no murmur, click or rub, no peripheral edema and peripheral pulses strong  ABDOMEN: soft, nontender, no hepatosplenomegaly, no masses and bowel sounds normal  MS: no gross musculoskeletal defects noted, no edema  SKIN: no suspicious lesions or rashes  NEURO: Normal strength and tone, mentation intact and speech normal  PSYCH: mentation appears normal, affect normal/bright    Diagnostic Test Results:  Labs reviewed in Epic    " "    Assessment & Plan   Assessment  HYPERTENSION, controlled. Associated with the following complications: None    Plan  1. Essential hypertension, benign  Controlled bp  - losartan-hydrochlorothiazide (HYZAAR) 50-12.5 MG tablet; Take 1 tablet by mouth daily  Dispense: 90 tablet; Refill: 3  - Comprehensive metabolic panel    2. Acquired hypothyroidism  Check levels and replace   - levothyroxine (SYNTHROID/LEVOTHROID) 100 MCG tablet; Take 1 tablet (100 mcg) by mouth daily  Dispense: 90 tablet; Refill: 3  - TSH  - T4, free    BMI:   Estimated body mass index is 26.53 kg/m  as calculated from the following:    Height as of this encounter: 1.638 m (5' 4.5\").    Weight as of this encounter: 71.2 kg (157 lb).   Weight management plan: Discussed healthy diet and exercise guidelines        MEDICATIONS:  Continue current medications without change    Return in about 1 year (around 1/20/2021) for Lab Work, Routine Visit.    Hector Dorado MD  [facility]        "

## 2020-01-21 LAB
ALBUMIN SERPL-MCNC: 4 G/DL (ref 3.4–5)
ALP SERPL-CCNC: 72 U/L (ref 40–150)
ALT SERPL W P-5'-P-CCNC: 25 U/L (ref 0–50)
ANION GAP SERPL CALCULATED.3IONS-SCNC: 8 MMOL/L (ref 3–14)
AST SERPL W P-5'-P-CCNC: 18 U/L (ref 0–45)
BILIRUB SERPL-MCNC: 0.6 MG/DL (ref 0.2–1.3)
BUN SERPL-MCNC: 12 MG/DL (ref 7–30)
CALCIUM SERPL-MCNC: 8.8 MG/DL (ref 8.5–10.1)
CHLORIDE SERPL-SCNC: 100 MMOL/L (ref 94–109)
CO2 SERPL-SCNC: 25 MMOL/L (ref 20–32)
CREAT SERPL-MCNC: 0.61 MG/DL (ref 0.52–1.04)
GFR SERPL CREATININE-BSD FRML MDRD: >90 ML/MIN/{1.73_M2}
GLUCOSE SERPL-MCNC: 116 MG/DL (ref 70–99)
POTASSIUM SERPL-SCNC: 3.5 MMOL/L (ref 3.4–5.3)
PROT SERPL-MCNC: 7.2 G/DL (ref 6.8–8.8)
SODIUM SERPL-SCNC: 133 MMOL/L (ref 133–144)
T4 FREE SERPL-MCNC: 1.22 NG/DL (ref 0.76–1.46)
TSH SERPL DL<=0.005 MIU/L-ACNC: 1.87 MU/L (ref 0.4–4)

## 2020-02-07 DIAGNOSIS — I10 ESSENTIAL HYPERTENSION, BENIGN: ICD-10-CM

## 2020-02-10 RX ORDER — LOSARTAN POTASSIUM AND HYDROCHLOROTHIAZIDE 12.5; 5 MG/1; MG/1
1 TABLET ORAL DAILY
Qty: 90 TABLET | Refills: 3 | OUTPATIENT
Start: 2020-02-10

## 2020-02-10 NOTE — TELEPHONE ENCOUNTER
Refused. Duplicate order. Filled on 1/20/20 for one year supply. Tatyana Patel RN on 2/10/2020 at 9:41 AM

## 2020-03-15 ENCOUNTER — HEALTH MAINTENANCE LETTER (OUTPATIENT)
Age: 60
End: 2020-03-15

## 2021-01-09 ENCOUNTER — HEALTH MAINTENANCE LETTER (OUTPATIENT)
Age: 61
End: 2021-01-09

## 2021-01-14 DIAGNOSIS — E03.9 ACQUIRED HYPOTHYROIDISM: ICD-10-CM

## 2021-01-15 DIAGNOSIS — I10 ESSENTIAL HYPERTENSION, BENIGN: ICD-10-CM

## 2021-01-15 RX ORDER — LOSARTAN POTASSIUM 50 MG/1
TABLET ORAL
Qty: 90 TABLET | Refills: 0 | OUTPATIENT
Start: 2021-01-15

## 2021-01-15 RX ORDER — LEVOTHYROXINE SODIUM 100 UG/1
TABLET ORAL
Qty: 90 TABLET | Refills: 0 | Status: SHIPPED | OUTPATIENT
Start: 2021-01-15 | End: 2021-03-17

## 2021-01-15 RX ORDER — HYDROCHLOROTHIAZIDE 12.5 MG/1
TABLET ORAL
Qty: 90 TABLET | Refills: 0 | OUTPATIENT
Start: 2021-01-15

## 2021-01-15 NOTE — TELEPHONE ENCOUNTER
Losartan and hydrochlorothiazide- current RX for combo.    Levothyroxine- Prescription approved per [facility] Refill Protocol.    Maday Harrison RN

## 2021-01-22 RX ORDER — LOSARTAN POTASSIUM AND HYDROCHLOROTHIAZIDE 12.5; 5 MG/1; MG/1
1 TABLET ORAL DAILY
Qty: 90 TABLET | Refills: 0 | Status: SHIPPED | OUTPATIENT
Start: 2021-01-22 | End: 2021-03-17

## 2021-01-22 NOTE — TELEPHONE ENCOUNTER
Prescription approved per [facility] Refill Protocol.    Urszula Hurst, RN, BSN, PHN  [facility] [place]

## 2021-01-23 ENCOUNTER — MYC REFILL (OUTPATIENT)
Dept: FAMILY MEDICINE | Facility: CLINIC | Age: 61
End: 2021-01-23

## 2021-01-23 DIAGNOSIS — I10 ESSENTIAL HYPERTENSION, BENIGN: ICD-10-CM

## 2021-01-23 RX ORDER — LOSARTAN POTASSIUM AND HYDROCHLOROTHIAZIDE 12.5; 5 MG/1; MG/1
1 TABLET ORAL DAILY
Qty: 90 TABLET | Refills: 0 | Status: CANCELLED | OUTPATIENT
Start: 2021-01-23

## 2021-03-01 LAB — PAP SMEAR - HIM PATIENT REPORTED: NEGATIVE

## 2021-03-17 ENCOUNTER — OFFICE VISIT (OUTPATIENT)
Dept: FAMILY MEDICINE | Facility: CLINIC | Age: 61
End: 2021-03-17
Payer: COMMERCIAL

## 2021-03-17 ENCOUNTER — TRANSFERRED RECORDS (OUTPATIENT)
Dept: MULTI SPECIALTY CLINIC | Facility: CLINIC | Age: 61
End: 2021-03-17

## 2021-03-17 VITALS
BODY MASS INDEX: 27.61 KG/M2 | RESPIRATION RATE: 16 BRPM | OXYGEN SATURATION: 96 % | SYSTOLIC BLOOD PRESSURE: 128 MMHG | TEMPERATURE: 98.9 F | DIASTOLIC BLOOD PRESSURE: 88 MMHG | HEART RATE: 95 BPM | WEIGHT: 163.4 LBS

## 2021-03-17 DIAGNOSIS — I10 ESSENTIAL HYPERTENSION, BENIGN: ICD-10-CM

## 2021-03-17 DIAGNOSIS — E03.9 ACQUIRED HYPOTHYROIDISM: Primary | ICD-10-CM

## 2021-03-17 LAB
ANION GAP SERPL CALCULATED.3IONS-SCNC: 5 MMOL/L (ref 3–14)
BUN SERPL-MCNC: 14 MG/DL (ref 7–30)
CALCIUM SERPL-MCNC: 9.1 MG/DL (ref 8.5–10.1)
CHLORIDE SERPL-SCNC: 100 MMOL/L (ref 94–109)
CO2 SERPL-SCNC: 29 MMOL/L (ref 20–32)
CREAT SERPL-MCNC: 0.76 MG/DL (ref 0.52–1.04)
CREAT UR-MCNC: 51 MG/DL
GFR SERPL CREATININE-BSD FRML MDRD: 85 ML/MIN/{1.73_M2}
GLUCOSE SERPL-MCNC: 72 MG/DL (ref 70–99)
MICROALBUMIN UR-MCNC: 9 MG/L
MICROALBUMIN/CREAT UR: 16.86 MG/G CR (ref 0–25)
POTASSIUM SERPL-SCNC: 3.7 MMOL/L (ref 3.4–5.3)
SODIUM SERPL-SCNC: 134 MMOL/L (ref 133–144)
TSH SERPL DL<=0.005 MIU/L-ACNC: 3.36 MU/L (ref 0.4–4)

## 2021-03-17 PROCEDURE — 84443 ASSAY THYROID STIM HORMONE: CPT | Performed by: PHYSICIAN ASSISTANT

## 2021-03-17 PROCEDURE — 36415 COLL VENOUS BLD VENIPUNCTURE: CPT | Performed by: PHYSICIAN ASSISTANT

## 2021-03-17 PROCEDURE — 80048 BASIC METABOLIC PNL TOTAL CA: CPT | Performed by: PHYSICIAN ASSISTANT

## 2021-03-17 PROCEDURE — 82043 UR ALBUMIN QUANTITATIVE: CPT | Performed by: PHYSICIAN ASSISTANT

## 2021-03-17 PROCEDURE — 99214 OFFICE O/P EST MOD 30 MIN: CPT | Performed by: PHYSICIAN ASSISTANT

## 2021-03-17 RX ORDER — LEVOTHYROXINE SODIUM 100 UG/1
TABLET ORAL
Qty: 90 TABLET | Refills: 3 | Status: SHIPPED | OUTPATIENT
Start: 2021-03-17 | End: 2022-04-11

## 2021-03-17 RX ORDER — LOSARTAN POTASSIUM AND HYDROCHLOROTHIAZIDE 12.5; 5 MG/1; MG/1
1 TABLET ORAL DAILY
Qty: 90 TABLET | Refills: 3 | Status: SHIPPED | OUTPATIENT
Start: 2021-03-17 | End: 2022-04-11

## 2021-03-17 RX ORDER — LIFITEGRAST 50 MG/ML
SOLUTION/ DROPS OPHTHALMIC
COMMUNITY
Start: 2021-03-12 | End: 2022-04-11

## 2021-03-17 NOTE — PROGRESS NOTES
Future Appointments   Date Time Provider Department Center   3/17/2021  9:10 AM Brenda Martinez PA-C [facility]     Appointment Notes for this encounter:   Medication review and obtain refills    Health Maintenance Due   Topic Date Due     ANNUAL REVIEW OF HM ORDERS  Never done     ADVANCE CARE PLANNING  Never done     ZOSTER IMMUNIZATION (1 of 2) Never done     PREVENTIVE CARE VISIT  11/29/2019     MICROALBUMIN  11/29/2019     MAMMO SCREENING  07/25/2020     INFLUENZA VACCINE (1) 09/01/2020     BMP  01/20/2021     TSH W/FREE T4 REFLEX  01/20/2021     PAP  07/25/2021     Health Maintenance addressed:  Mammogram, Pap Smear and Immunizations    Pap Smear Pt agrees to schedule appointment today or future appointment scheduled, Mammogram Pt agrees to schedule appt today or future appt scheduled and Immunizations Pt declined    MyChart Status:  Active and Using      Assessment & Plan     Acquired hypothyroidism  Await labs  - TSH WITH FREE T4 REFLEX  - levothyroxine (SYNTHROID/LEVOTHROID) 100 MCG tablet; Take 1 tablet (100 mcg) by mouth daily    Essential hypertension, benign  controlled  - REVIEW OF HEALTH MAINTENANCE PROTOCOL ORDERS  - Albumin Random Urine Quantitative with Creat Ratio  - BASIC METABOLIC PANEL  - losartan-hydrochlorothiazide (HYZAAR) 50-12.5 MG tablet; Take 1 tablet by mouth daily     No follow-ups on file.    Brenda Martinez PA-C  [facility]    Tatyana Taylor is a 60 year old who presents for the following health issues     History of Present Illness       She eats 2-3 servings of fruits and vegetables daily.She consumes 1 sweetened beverage(s) daily.She exercises with enough effort to increase her heart rate 20 to 29 minutes per day.  She exercises with enough effort to increase her heart rate 7 days per week.   She is taking medications regularly.       Medication Followup of  Levothyroxine & Losartan    Taking Medication as prescribed: yes    Side Effects:   None    Medication Helping Symptoms:  yes         Review of Systems   Constitutional, HEENT, cardiovascular, pulmonary, gi and gu systems are negative, except as otherwise noted.      Objective    /88 (BP Location: Right arm, Patient Position: Sitting, Cuff Size: Adult Regular)   Pulse 95   Temp 98.9  F (37.2  C) (Oral)   Resp 16   Wt 74.1 kg (163 lb 6.4 oz)   SpO2 96%   BMI 27.61 kg/m    Body mass index is 27.61 kg/m .  Physical Exam   GENERAL APPEARANCE: healthy, alert and no distress  RESP: lungs clear to auscultation - no rales, rhonchi or wheezes  CV: regular rates and rhythm, normal S1 S2, no S3 or S4 and no murmur, click or rub

## 2021-05-08 ENCOUNTER — HEALTH MAINTENANCE LETTER (OUTPATIENT)
Age: 61
End: 2021-05-08

## 2021-07-08 ENCOUNTER — NURSE TRIAGE (OUTPATIENT)
Dept: NURSING | Facility: CLINIC | Age: 61
End: 2021-07-08

## 2021-07-08 ENCOUNTER — HOSPITAL ENCOUNTER (EMERGENCY)
Facility: CLINIC | Age: 61
Discharge: HOME | End: 2021-07-08
Attending: EMERGENCY MEDICINE | Admitting: EMERGENCY MEDICINE
Payer: COMMERCIAL

## 2021-07-08 ENCOUNTER — APPOINTMENT (OUTPATIENT)
Dept: ULTRASOUND IMAGING | Facility: CLINIC | Age: 61
End: 2021-07-08
Attending: EMERGENCY MEDICINE
Payer: COMMERCIAL

## 2021-07-08 VITALS
DIASTOLIC BLOOD PRESSURE: 87 MMHG | RESPIRATION RATE: 18 BRPM | SYSTOLIC BLOOD PRESSURE: 147 MMHG | OXYGEN SATURATION: 98 % | HEART RATE: 86 BPM | TEMPERATURE: 97.6 F

## 2021-07-08 DIAGNOSIS — R60.0 LEG EDEMA, RIGHT: ICD-10-CM

## 2021-07-08 LAB
ANION GAP SERPL CALCULATED.3IONS-SCNC: 6 MMOL/L (ref 3–14)
BASOPHILS # BLD AUTO: 0 10E9/L (ref 0–0.2)
BASOPHILS NFR BLD AUTO: 0.7 %
BUN SERPL-MCNC: 14 MG/DL (ref 7–30)
CALCIUM SERPL-MCNC: 8.9 MG/DL (ref 8.5–10.1)
CHLORIDE SERPL-SCNC: 96 MMOL/L (ref 94–109)
CO2 SERPL-SCNC: 29 MMOL/L (ref 20–32)
CREAT SERPL-MCNC: 0.77 MG/DL (ref 0.52–1.04)
DIFFERENTIAL METHOD BLD: NORMAL
EOSINOPHIL # BLD AUTO: 0.2 10E9/L (ref 0–0.7)
EOSINOPHIL NFR BLD AUTO: 3.2 %
ERYTHROCYTE [DISTWIDTH] IN BLOOD BY AUTOMATED COUNT: 11.9 % (ref 10–15)
GFR SERPL CREATININE-BSD FRML MDRD: 83 ML/MIN/{1.73_M2}
GLUCOSE SERPL-MCNC: 100 MG/DL (ref 70–99)
HCT VFR BLD AUTO: 44.2 % (ref 35–47)
HGB BLD-MCNC: 15.4 G/DL (ref 11.7–15.7)
IMM GRANULOCYTES # BLD: 0 10E9/L (ref 0–0.4)
IMM GRANULOCYTES NFR BLD: 0.4 %
LYMPHOCYTES # BLD AUTO: 1.4 10E9/L (ref 0.8–5.3)
LYMPHOCYTES NFR BLD AUTO: 26.4 %
MCH RBC QN AUTO: 30.4 PG (ref 26.5–33)
MCHC RBC AUTO-ENTMCNC: 34.8 G/DL (ref 31.5–36.5)
MCV RBC AUTO: 87 FL (ref 78–100)
MONOCYTES # BLD AUTO: 0.5 10E9/L (ref 0–1.3)
MONOCYTES NFR BLD AUTO: 8.7 %
NEUTROPHILS # BLD AUTO: 3.3 10E9/L (ref 1.6–8.3)
NEUTROPHILS NFR BLD AUTO: 60.6 %
NRBC # BLD AUTO: 0 10*3/UL
NRBC BLD AUTO-RTO: 0 /100
PLATELET # BLD AUTO: 224 10E9/L (ref 150–450)
POTASSIUM SERPL-SCNC: 3.6 MMOL/L (ref 3.4–5.3)
RBC # BLD AUTO: 5.07 10E12/L (ref 3.8–5.2)
SODIUM SERPL-SCNC: 131 MMOL/L (ref 133–144)
WBC # BLD AUTO: 5.4 10E9/L (ref 4–11)

## 2021-07-08 PROCEDURE — 80048 BASIC METABOLIC PNL TOTAL CA: CPT | Performed by: EMERGENCY MEDICINE

## 2021-07-08 PROCEDURE — 99284 EMERGENCY DEPT VISIT MOD MDM: CPT | Mod: 25

## 2021-07-08 PROCEDURE — 93971 EXTREMITY STUDY: CPT | Mod: RT

## 2021-07-08 PROCEDURE — 85025 COMPLETE CBC W/AUTO DIFF WBC: CPT | Performed by: EMERGENCY MEDICINE

## 2021-07-08 ASSESSMENT — ENCOUNTER SYMPTOMS
DIARRHEA: 0
ARTHRALGIAS: 0
NAUSEA: 0
VOMITING: 0
SHORTNESS OF BREATH: 0

## 2021-07-08 NOTE — TELEPHONE ENCOUNTER
"Claudia reports Rt calf swelling that she has measured to be ~1\" larger than her Lt calf, (measuring ~5\" below knee cap).    She also reports that there is a lot of \"puffiness\" behind her right knee, ~ the size of a golf ball in diameter.    There is no pain to the calf.  The only discomfort is when she bends her knee - with the swelling and puffiness, it feels very tight.    She works at home and reports that she has some leg swelling every day that is relieved in the evenings by elevating her legs.    Per protocol, advised to see HCP within 4 hours or PCP triage. Notified that on-call provider would be paged and RN will call back.    6:18 pm - Smart web page sent to on-call provider, Dr. Disha Magaña:  FABIANO [facility]  [phone]  Pt: Claudia Ellison  : [birth year]  Rt calf swelling - 1\" more than Lt; \"puffy\" behind Rt knee; No pain,redness; Discomfort with bending  MRN: [id number]  PCP: FARNAZ Martinez    6:19 pm - Call received from Dr. Magaña. ER advised    6:27 pm - Notified pt of MD advice as noted above. Pt will go to [facility]    COVID 19 Nurse Triage Plan/Patient Instructions    Please be aware that novel coronavirus (COVID-19) may be circulating in the community. If you develop symptoms such as fever, cough, or SOB or if you have concerns about the presence of another infection including coronavirus (COVID-19), please contact your health care provider or visit [email or web address].     Disposition/Instructions    ED Visit recommended. Follow protocol based instructions.     Bring Your Own Device:  Please also bring your smart device(s) (smart phones, tablets, laptops) and their charging cables for your personal use and to communicate with your care team during your visit.    Thank you for taking steps to prevent the spread of this virus.  o Limit your contact with others.  o Wear a simple mask to cover your cough.  o Wash your hands well and often.    Resources     [facility]: About " COVID-19: [email or web address]    CDC: What to Do If You're Sick: www.cdc.gov/coronavirus/2019-ncov/about/steps-when-sick.html    CDC: Ending Home Isolation: www.cdc.gov/coronavirus/2019-ncov/hcp/disposition-in-home-patients.html     CDC: Caring for Someone: www.cdc.gov/coronavirus/2019-ncov/if-you-are-sick/care-for-someone.html     [facility] [place] [facility]: Interim Guidance for Hospital Discharge to Home: www.health.[facility]/diseases/coronavirus/hcp/hospdischarge.pdf    [facility] clinical trials (COVID-19 research studies): [email or web address]-clinical-trials     Below are the COVID-19 hotlines at the [place] Department of [facility] ([facility] [place] [facility]). Interpreters are available.   o For health questions: Call 377-891-3464 or 1-240.989.8843 (7 a.m. to 7 p.m.)  o For questions about schools and childcare: Call 395-908-7865 or 1-424.927.9391 (7 a.m. to 7 p.m.)     Macey Goel RN  [facility]      Reason for Disposition    [1] Thigh, calf, or ankle swelling AND [2] only 1 side    Additional Information    Negative: Severe difficulty breathing (e.g., struggling for each breath, speaks in single words)    Negative: Looks like a broken bone or dislocated joint (e.g., crooked or deformed)    Negative: Sounds like a life-threatening emergency to the triager    Negative: Difficulty breathing at rest    Negative: Entire foot is cool or blue in comparison to other side    Negative: [1] Can't walk or can barely walk AND [2] new onset    Negative: [1] Difficulty breathing with exertion (e.g., walking) AND [2] new onset or worsening    Negative: [1] Red area or streak AND [2] fever    Negative: [1] Swelling is painful to touch AND [2] fever    Negative: [1] Cast on leg or ankle AND [2] now increased pain    Negative: Patient sounds very sick or weak to the triager    Negative: SEVERE leg swelling (e.g., swelling extends above knee, entire leg is swollen, weeping fluid)    Negative: [1] Red area or  streak [2] large (> 2 in. or 5 cm)    Negative: [1] Thigh or calf pain AND [2] only 1 side AND [3] present > 1 hour    Protocols used: LEG SWELLING AND EDEMA-A-AH

## 2021-07-09 NOTE — ED TRIAGE NOTES
Painless Rt calf swelling x 1 day.  Pt has B leg swelling often that resolves by elevating legs.  Concerned for DVT

## 2021-07-09 NOTE — ED PROVIDER NOTES
History     Chief Complaint:  Leg Swelling    The history is provided by the patient.      Claudia Ellison is a 61 year old female who presents with leg swelling. The patient has chronic bilateral lower extremity edema that fluctuates with elevation of the legs. Today she noticed her right leg was more swollen than the left. There is no pain but she is worried about a possible DVT.  No chest pain, shortness of breath, abdominal pain, nausea, vomiting, diarrhea, or joint pain.       Review of Systems   Respiratory: Negative for shortness of breath.    Cardiovascular: Positive for leg swelling. Negative for chest pain.   Gastrointestinal: Negative for diarrhea, nausea and vomiting.   Musculoskeletal: Negative for arthralgias.   All other systems reviewed and are negative.        Allergies:  No Known Drug Allergies    Medications:    Biotin   Synthroid   Hyzaar   Lutein     Past Medical History:    Essential hypertension, benign   History of blood transfusion   HYPOTHYROIDISM NOS   Lateral epicondylitis   Microalbuminuria   Vega's neuroma   Plantar fasciitis   Tubular adenoma of colon   Acquired Hypothyroidism   Lateral Epicondylitis  Female Climacteric   Microal    Past Surgical History:    Soft Tissue Surgery     Family History:    Mother - Asthma, Macular degeneration, HTN, Lung Cancer, Osteoporosis   Father - ALS ()  Son - Depression   Brother - Hypocholesteremia  Half Brother - HTN, HLD, CVD     Social History:  Patient was unaccompanied to the ED.    Physical Exam     Patient Vitals for the past 24 hrs:   BP Temp Temp src Pulse Resp SpO2   21 2116 (!) 147/87 -- -- 86 18 98 %   21 1910 (!) 176/108 97.6  F (36.4  C) Temporal 92 18 99 %       Physical Exam  Constitutional: Patient is well appearing. No distress.  Head: Atraumatic.  Eyes: Conjunctivae and EOM are normal. No scleral icterus.  Neck: Normal range of motion. Neck supple.   Cardiovascular: Normal rate, regular rhythm, normal heart  sounds and intact distal pulses.   Pulmonary/Chest: Breath sounds normal. No respiratory distress.  Abdominal: Soft. Bowel sounds are normal. No distension. No tenderness. No rebound or guarding.   Musculoskeletal: Normal range of motion. No tenderness. Right leg larger than left. No tenderness or warmth. No joint involvement.   Neurological: Alert and orientated to person, place, and time. No observable focal neuro deficit  Skin: Warm and dry. No rash noted. Not diaphoretic.     Emergency Department Course   Imaging:  US Lower Extremity Venous Duplex Right  Final Result  IMPRESSION:  1.  No deep venous thrombosis in the right lower extremity.   Per Radiology     Laboratory:  CBC: WBC 5.4, HGB 15.4,   BMP: Na - 131, Glucose - 100 (H) o/w WNL (Creatinine 0.77)     Emergency Department Course:    Reviewed:  I reviewed nursing notes, vitals and past history    Assessments:  1942 I obtained history and examined the patient as noted above.    I rechecked the patient and explained findings.     Disposition:  The patient was discharged to home.    Impression & Plan    Medical Decision Making:  Claudia Ellison is a 61 year old female who presents for evaluation of leg swelling. A broad differential was considered including Baker's cyst rupture, Baker's cyst, hematoma, rupture, compartment syndrome, muscle rupture, DVT, superficial thrombophlebitis, compression of the venous structures higher up in the abdomen and or leg cellulitis/ abscess, etc. Ultrasound is negative. There are no signs of compartment syndrome or other worrisome etiologies at this point so outpatient management is indicated.  I doubt more central causes of their swelling like renal failure, chf, liver disease, etc. They will elevate leg, do gentle dorsal flexion and plantar flexion motions, take Tylenol for pain, and avoid strenuous activity. They should followup with her primary care doctor. Written copy of lab data given directly to patient and  questions answered.      All questions and concerns were answered. The patient was discharged home and recommended to follow up with his primary physician and return with any new or worsening symptoms.       Covid-19  Claudia Ellison was evaluated during a global COVID-19 pandemic, which necessitated consideration that the patient might be at risk for infection with the SARS-CoV-2 virus that causes COVID-19.   Applicable protocols for evaluation were followed during the patient's care.     Diagnosis:    ICD-10-CM    1. Leg edema, right  R60.0        Scribe Disclosure:  I, Noman Rehman, am serving as a scribe at 7:42 PM on 7/8/2021 to document services personally performed by Regis Corbett MD based on my observations and the provider's statements to me.      Regis Corbett MD  07/08/21 [id number]

## 2021-07-13 NOTE — PROGRESS NOTES
SUBJECTIVE:                                                    Claudia Ellison is a 57 year old female who presents to clinic today for the following health issues:      Hypertension   Hypertension:     Outpatient blood pressures:  Are not being checked    Dietary sodium intake::  Low salt diet      Swallowing difficulty      Duration: Few weeks    Description (location/character/radiation): Meats and bread get stuck     Intensity:  mild    Accompanying signs and symptoms: One episode with gagging, but otherwise no    History (similar episodes/previous evaluation): None    Precipitating or alleviating factors: None    Therapies tried and outcome: None     Hypothyroidism Follow-up      Since last visit, patient describes the following symptoms: Weight stable, no hair loss, no skin changes, no constipation, no loose stools        Problem list and histories reviewed & adjusted, as indicated.  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     CARDIOVASCULAR SCREENING; LDL GOAL LESS THAN 160     Tubular adenoma of colon     Microalbuminuria     Essential hypertension, benign     Acquired hypothyroidism     Past Surgical History:   Procedure Laterality Date     BIOPSY      Mother     CARDIAC SURGERY      Mother     CHOLECYSTECTOMY       COLONOSCOPY      Myself     SOFT TISSUE SURGERY      Myself       Social History   Substance Use Topics     Smoking status: Never Smoker     Smokeless tobacco: Never Used     Alcohol use Yes      Comment: Once a week or less     Family History   Problem Relation Age of Onset     Respiratory Mother      Asthma     Eye Disorder Mother      macular degeneration     Lipids Mother      Hypertension Mother      Other Cancer Mother      Lung     Asthma Mother      OSTEOPOROSIS Mother      Musculoskeletal Disorder Father      ALS;      Depression Son      Lipids Brother      hypercholestermia      Dr Owen and Dr Carcamo was perfect served about patient   Hypertension Maternal Half-Brother      Hyperlipidemia Maternal Half-Brother      CEREBROVASCULAR DISEASE Maternal Half-Brother              ROS:  Constitutional, HEENT, cardiovascular, pulmonary, gi and gu systems are negative, except as otherwise noted.      OBJECTIVE:   BP (!) 138/94 (BP Location: Right arm, Patient Position: Chair, Cuff Size: Adult Regular)  Pulse 72  Temp 98.4  F (36.9  C) (Oral)  Resp 12  Wt 158 lb (71.7 kg)  BMI 26.29 kg/m2  Body mass index is 26.29 kg/(m^2).  GENERAL: healthy, alert and no distress  EYES: Eyes grossly normal to inspection, PERRL and conjunctivae and sclerae normal  HENT: ear canals and TM's normal, nose and mouth without ulcers or lesions  NECK: no adenopathy, no asymmetry, masses, or scars and thyroid normal to palpation  RESP: lungs clear to auscultation - no rales, rhonchi or wheezes  CV: regular rate and rhythm, normal S1 S2, no S3 or S4, no murmur, click or rub, no peripheral edema and peripheral pulses strong    ASSESSMENT/PLAN:     1. Essential hypertension, benign  - BP is not well controlled   - Continue losartan 50mg and will add HCTZ   - losartan-hydrochlorothiazide (HYZAAR) 50-12.5 MG per tablet; Take 1 tablet by mouth daily  Dispense: 90 tablet; Refill: 0  - Comprehensive metabolic panel    2. Acquired hypothyroidism  - TSH  - T4 free  - US Thyroid; Future    3. Need for hepatitis C screening test  - Patient had a transfusion as a child   - Hepatitis C antibody    4. Dysphagia, unspecified type  - If US is normal, consider swallow study   - US Thyroid; Future    F/U after labs and US are completed.  BP follow up in 3 months.    Ana Bell DO  [facility]

## 2021-10-23 ENCOUNTER — HEALTH MAINTENANCE LETTER (OUTPATIENT)
Age: 61
End: 2021-10-23

## 2021-12-18 ENCOUNTER — HEALTH MAINTENANCE LETTER (OUTPATIENT)
Age: 61
End: 2021-12-18

## 2022-02-24 ENCOUNTER — TELEPHONE (OUTPATIENT)
Dept: FAMILY MEDICINE | Facility: CLINIC | Age: 62
End: 2022-02-24
Payer: COMMERCIAL

## 2022-02-24 NOTE — TELEPHONE ENCOUNTER
Patient Quality Outreach    Patient is due for the following:   Hypertension -  Hypertension follow-up visit  Breast Cancer Screening - Mammogram  Immunizations  -  Covid, Influenza, Tdap and Zoster    NEXT STEPS:   Schedule a office visit for hypertension follow up    Type of outreach:    Sent [employer] message.      Questions for provider review:    Order mammo     Milo Lua    Patient Quality Outreach    Patient is due for the following:   Hypertension -  Hypertension follow-up visit  Breast Cancer Screening - Mammogram  Immunizations  -  Covid, Influenza, Tdap and Zoster    NEXT STEPS:   Patient has upcoming appointment, these items will be addressed at that time.    Type of outreach:    Chart review performed, no outreach needed.    Next Steps:  Reach out within 90 days via Phone.    Max number of attempts reached: Yes. Will try again in 90 days if patient still on fail list.    Questions for provider review:    None     Milo Lua  Chart routed to Care Team.

## 2022-04-07 ENCOUNTER — NURSE TRIAGE (OUTPATIENT)
Dept: FAMILY MEDICINE | Facility: CLINIC | Age: 62
End: 2022-04-07
Payer: COMMERCIAL

## 2022-04-07 NOTE — TELEPHONE ENCOUNTER
Pt calling and was transferred to writer via priority line. States BP has been higher and is having an out of body experience. This has been going on for almost 1 month. Not daily. Took BP on Tuesday pm and was 150/99 took at 2000 and went down to 120/83.   Yesterday am at 0530 BP was 135/90, in afternoon was 134/88. This am 120/84. Is taking losartan.   No chest pain. No SOB. No dizziness. No palpitations. Feels out of her body late afternoons. Started feeling today around 2pm. Lasts a couple of hours. Doesn't feel it when in bed. Has noted this the last couple of days.   This started the week of Feb 20th.    had to leave to take care of father who is aging. Doesn't feel like her regular self. No numbness or tingling. No headaches. Hands feel cold later in the afternoons.   Only other thing that is different is hungry all of the time. Will eat then feels hungry again.   Advised that BP goal is < 140/90, so BP of 150/99 was elevated and other BP readings were ok. Per protocol, pt is to be seen today. No openings today or tomorrow at preferred clinic. Pt prefers to see Brenda Martinez PA-C and soonest appt is 4/14 for video visit. Pt was scheduled with Dr. Magaña for 4/11. Advised pt to go to ER if she develops chest pain, SOB, dizziness, headache, weakness, numbness. Pt verbalized understanding and agrees with plan. Routing to pt's primary clinic as FYI and to advise if any further recommendations.    Caitlin Garcia RN  [facility]    Reason for Disposition    Patient wants to be seen    Additional Information    Negative: Sounds like a life-threatening emergency to the triager    Negative: Pregnant > 20 weeks or postpartum (< 6 weeks after delivery) and new hand or face swelling    Negative: Pregnant > 20 weeks and BP > 140/90    Negative: Systolic BP >= 160 OR Diastolic >= 100, and any cardiac or neurologic symptoms (e.g., chest pain, difficulty breathing, unsteady gait, blurred  vision)    Negative: Patient sounds very sick or weak to the triager    Negative: BP Systolic BP >= 140 OR Diastolic >= 90 and postpartum (from 0 to 6 weeks after delivery)    Negative: Systolic BP >= 180 OR Diastolic >= 110, and missed most recent dose of blood pressure medication    Negative: Systolic BP >= 180 OR Diastolic >= 110    Protocols used: HIGH BLOOD PRESSURE-A-OH

## 2022-04-11 ENCOUNTER — OFFICE VISIT (OUTPATIENT)
Dept: FAMILY MEDICINE | Facility: CLINIC | Age: 62
End: 2022-04-11
Payer: COMMERCIAL

## 2022-04-11 VITALS
SYSTOLIC BLOOD PRESSURE: 132 MMHG | RESPIRATION RATE: 12 BRPM | HEART RATE: 86 BPM | DIASTOLIC BLOOD PRESSURE: 80 MMHG | BODY MASS INDEX: 26.7 KG/M2 | WEIGHT: 158 LBS | OXYGEN SATURATION: 98 % | TEMPERATURE: 99 F

## 2022-04-11 DIAGNOSIS — I10 ESSENTIAL HYPERTENSION, BENIGN: Primary | ICD-10-CM

## 2022-04-11 DIAGNOSIS — Z20.820 EXPOSURE TO CHICKENPOX: ICD-10-CM

## 2022-04-11 DIAGNOSIS — O09.899 MATERNAL VARICELLA, NON-IMMUNE: ICD-10-CM

## 2022-04-11 DIAGNOSIS — R80.9 MICROALBUMINURIA: ICD-10-CM

## 2022-04-11 DIAGNOSIS — Z20.822 EXPOSURE TO 2019 NOVEL CORONAVIRUS: ICD-10-CM

## 2022-04-11 DIAGNOSIS — Z12.31 VISIT FOR SCREENING MAMMOGRAM: ICD-10-CM

## 2022-04-11 DIAGNOSIS — Z12.4 CERVICAL CANCER SCREENING: ICD-10-CM

## 2022-04-11 DIAGNOSIS — Z28.39 MATERNAL VARICELLA, NON-IMMUNE: ICD-10-CM

## 2022-04-11 DIAGNOSIS — E03.9 ACQUIRED HYPOTHYROIDISM: ICD-10-CM

## 2022-04-11 LAB
ANION GAP SERPL CALCULATED.3IONS-SCNC: 5 MMOL/L (ref 3–14)
BUN SERPL-MCNC: 12 MG/DL (ref 7–30)
CALCIUM SERPL-MCNC: 9.3 MG/DL (ref 8.5–10.1)
CHLORIDE BLD-SCNC: 96 MMOL/L (ref 94–109)
CO2 SERPL-SCNC: 29 MMOL/L (ref 20–32)
CREAT SERPL-MCNC: 0.74 MG/DL (ref 0.52–1.04)
GFR SERPL CREATININE-BSD FRML MDRD: >90 ML/MIN/1.73M2
GLUCOSE BLD-MCNC: 109 MG/DL (ref 70–99)
POTASSIUM BLD-SCNC: 3.5 MMOL/L (ref 3.4–5.3)
SODIUM SERPL-SCNC: 130 MMOL/L (ref 133–144)
TSH SERPL DL<=0.005 MIU/L-ACNC: 2.68 MU/L (ref 0.4–4)

## 2022-04-11 PROCEDURE — 82043 UR ALBUMIN QUANTITATIVE: CPT | Performed by: FAMILY MEDICINE

## 2022-04-11 PROCEDURE — 86769 SARS-COV-2 COVID-19 ANTIBODY: CPT | Mod: 90 | Performed by: FAMILY MEDICINE

## 2022-04-11 PROCEDURE — 36415 COLL VENOUS BLD VENIPUNCTURE: CPT | Performed by: FAMILY MEDICINE

## 2022-04-11 PROCEDURE — 84443 ASSAY THYROID STIM HORMONE: CPT | Performed by: FAMILY MEDICINE

## 2022-04-11 PROCEDURE — 86787 VARICELLA-ZOSTER ANTIBODY: CPT | Performed by: FAMILY MEDICINE

## 2022-04-11 PROCEDURE — 99214 OFFICE O/P EST MOD 30 MIN: CPT | Performed by: FAMILY MEDICINE

## 2022-04-11 PROCEDURE — 99000 SPECIMEN HANDLING OFFICE-LAB: CPT | Performed by: FAMILY MEDICINE

## 2022-04-11 PROCEDURE — 80048 BASIC METABOLIC PNL TOTAL CA: CPT | Performed by: FAMILY MEDICINE

## 2022-04-11 RX ORDER — LEVOTHYROXINE SODIUM 100 UG/1
TABLET ORAL
Qty: 90 TABLET | Refills: 3 | Status: SHIPPED | OUTPATIENT
Start: 2022-04-11 | End: 2023-03-31

## 2022-04-11 RX ORDER — BUDESONIDE 0.5 MG/2ML
INHALANT ORAL
COMMUNITY
Start: 2021-09-03 | End: 2022-04-11

## 2022-04-11 RX ORDER — LOSARTAN POTASSIUM AND HYDROCHLOROTHIAZIDE 12.5; 5 MG/1; MG/1
1 TABLET ORAL DAILY
Qty: 90 TABLET | Refills: 3 | Status: SHIPPED | OUTPATIENT
Start: 2022-04-11 | End: 2022-04-11 | Stop reason: SINTOL

## 2022-04-11 RX ORDER — LOSARTAN POTASSIUM 100 MG/1
100 TABLET ORAL DAILY
Qty: 30 TABLET | Refills: 3 | Status: SHIPPED | OUTPATIENT
Start: 2022-04-11 | End: 2022-05-27

## 2022-04-11 NOTE — PROGRESS NOTES
"  Assessment & Plan     Visit for screening mammogram    - MA SCREENING DIGITAL BILAT - Future  (s+30)    Acquired hypothyroidism  Will check   - TSH with free T4 reflex  - Basic metabolic panel  (Ca, Cl, CO2, Creat, Gluc, K, Na, BUN)  - levothyroxine (SYNTHROID/LEVOTHROID) 100 MCG tablet  Dispense: 90 tablet; Refill: 3    Cervical cancer screening  She gets this at [facility]    Essential hypertension, benign  Under pretty good control - some values a little high - would consider 100/12.5 in future    - Albumin Random Urine Quantitative with Creat Ratio  - Basic metabolic panel  (Ca, Cl, CO2, Creat, Gluc, K, Na, BUN)  - losartan-hydrochlorothiazide (HYZAAR) 50-12.5 MG tablet  Dispense: 90 tablet; Refill: 3    Microalbuminuria    - Basic metabolic panel  (Ca, Cl, CO2, Creat, Gluc, K, Na, BUN)    Exposure to 2019 novel coronavirus    - COVID-19 Rajat RBD Bernie & Titer Reflex    Exposure to chickenpox    - Varicella Zoster Virus Antibody IgG        30 minutes spent on the date of the encounter doing chart review, review of test results, patient visit and documentation            Return in about 4 months (around 8/11/2022) for Physical Exam.    Disha Magaña MD  [facility]    Tatyana Taylor is a 62 year old who presents for the following health issues - she is feeling very hungry for the last 2 weeks.   She is having weird spells where her hands gets cold and she feels \"out of body\"  Also her blood pressure has been up and down.      HPI     Hypertension Follow-up      Do you check your blood pressure regularly outside of the clinic? Yes     Are you following a low salt diet? Yes    Are your blood pressures ever more than 140 on the top number (systolic) OR more   than 90 on the bottom number (diastolic), for example 140/90? Yes      How many servings of fruits and vegetables do you eat daily?  2-3    On average, how many sweetened beverages do you drink each day (Examples: soda, juice, " sweet tea, etc.  Do NOT count diet or artificially sweetened beverages)?   1    How many days per week do you exercise enough to make your heart beat faster? 7    How many minutes a day do you exercise enough to make your heart beat faster? 20 - 29    How many days per week do you miss taking your medication? 0    Past Medical History:   Diagnosis Date     CARDIOVASCULAR SCREENING; LDL GOAL LESS THAN 160 10/31/2010     Essential hypertension, benign 12/16/2015     History of blood transfusion At birth     HYPOTHYROIDISM NOS 6/4/2007     Lateral epicondylitis 4/26/2007     Microalbuminuria 10/10/2014     Vega's neuroma      Plantar fasciitis      Tubular adenoma of colon        Past Surgical History:   Procedure Laterality Date     BIOPSY      Mother     CARDIAC SURGERY      Mother     CHOLECYSTECTOMY       COLONOSCOPY N/A 10/22/2019    lela Pruitt in 5 years     SOFT TISSUE SURGERY      Myself       MEDICATIONS:  Current Outpatient Medications   Medication     Biotin 5000 MCG CAPS     budesonide (PULMICORT) 0.5 MG/2ML neb solution     CHELATED MAGNESIUM PO     cholecalciferol (VITAMIN D3) 5000 UNITS CAPS capsule     estradiol (VAGIFEM) 10 MCG TABS vaginal tablet     Krill Oil 350 MG CAPS     levothyroxine (SYNTHROID/LEVOTHROID) 100 MCG tablet     losartan-hydrochlorothiazide (HYZAAR) 50-12.5 MG tablet     Lutein-Zeaxanthin 25-5 MG CAPS     Menaquinone-7 (VITAMIN K2 PO)     Misc Natural Products (LUTEIN 20) CAPS     Omega-3 Fatty Acids (OMEGA-3 FISH OIL PO)     order for DME     UNABLE TO FIND     XIIDRA 5 % opthalmic solution     No current facility-administered medications for this visit.       SOCIAL HISTORY:  Social History     Tobacco Use     Smoking status: Never Smoker     Smokeless tobacco: Never Used   Substance Use Topics     Alcohol use: Yes     Comment: Once a week or less       Family History   Problem Relation Age of Onset     Respiratory Mother         Asthma     Eye Disorder Mother         macular  degeneration     Lipids Mother      Hypertension Mother      Other Cancer Mother         Lung     Asthma Mother      Osteoporosis Mother      Musculoskeletal Disorder Father         ALS;      Depression Son      Lipids Brother         hypercholestermia     Hypertension Maternal Half-Brother      Hyperlipidemia Maternal Half-Brother      Cerebrovascular Disease Maternal Half-Brother            Review of Systems   Constitutional, HEENT, cardiovascular, pulmonary, gi and gu systems are negative, except as otherwise noted.      Objective    /80 (BP Location: Right arm, Patient Position: Chair, Cuff Size: Adult Regular)   Pulse 86   Temp 99  F (37.2  C) (Oral)   Resp 12   Wt 71.7 kg (158 lb)   SpO2 98%   BMI 26.70 kg/m    Body mass index is 26.7 kg/m .  Physical Exam   GENERAL: healthy, alert and no distress  EYES: Eyes grossly normal to inspection, PERRL and conjunctivae and sclerae normal  NECK: no adenopathy, no asymmetry, masses, or scars and thyroid normal to palpation  RESP: lungs clear to auscultation - no rales, rhonchi or wheezes  CV: regular rate and rhythm, normal S1 S2, no S3 or S4, no murmur, click or rub, no peripheral edema and peripheral pulses strong  ABDOMEN: soft, nontender, no hepatosplenomegaly, no masses and bowel sounds normal  MS: no gross musculoskeletal defects noted, no edema  SKIN: no suspicious lesions or rashes  NEURO: Normal strength and tone, mentation intact and speech normal  PSYCH: mentation appears normal, affect normal/bright  LYMPH: no cervical, supraclavicular, axillary, or inguinal adenopathy    Admission on 2021, Discharged on 2021   Component Date Value Ref Range Status     WBC 2021 5.4  4.0 - 11.0 10e9/L Final     RBC Count 2021 5.07  3.8 - 5.2 10e12/L Final     Hemoglobin 2021 15.4  11.7 - 15.7 g/dL Final     Hematocrit 2021 44.2  35.0 - 47.0 % Final     MCV 2021 87  78 - 100 fl Final     MCH 2021 30.4  26.5 -  33.0 pg Final     MCHC 07/08/2021 34.8  31.5 - 36.5 g/dL Final     RDW 07/08/2021 11.9  10.0 - 15.0 % Final     Platelet Count 07/08/2021 224  150 - 450 10e9/L Final     Diff Method 07/08/2021 Automated Method   Final     % Neutrophils 07/08/2021 60.6  % Final     % Lymphocytes 07/08/2021 26.4  % Final     % Monocytes 07/08/2021 8.7  % Final     % Eosinophils 07/08/2021 3.2  % Final     % Basophils 07/08/2021 0.7  % Final     % Immature Granulocytes 07/08/2021 0.4  % Final     Nucleated RBCs 07/08/2021 0  0 /100 Final     Absolute Neutrophil 07/08/2021 3.3  1.6 - 8.3 10e9/L Final     Absolute Lymphocytes 07/08/2021 1.4  0.8 - 5.3 10e9/L Final     Absolute Monocytes 07/08/2021 0.5  0.0 - 1.3 10e9/L Final     Absolute Eosinophils 07/08/2021 0.2  0.0 - 0.7 10e9/L Final     Absolute Basophils 07/08/2021 0.0  0.0 - 0.2 10e9/L Final     Abs Immature Granulocytes 07/08/2021 0.0  0 - 0.4 10e9/L Final     Absolute Nucleated RBC 07/08/2021 0.0   Final     Sodium 07/08/2021 131 (A) 133 - 144 mmol/L Final     Potassium 07/08/2021 3.6  3.4 - 5.3 mmol/L Final     Chloride 07/08/2021 96  94 - 109 mmol/L Final     Carbon Dioxide 07/08/2021 29  20 - 32 mmol/L Final     Anion Gap 07/08/2021 6  3 - 14 mmol/L Final     Glucose 07/08/2021 100 (A) 70 - 99 mg/dL Final     Urea Nitrogen 07/08/2021 14  7 - 30 mg/dL Final     Creatinine 07/08/2021 0.77  0.52 - 1.04 mg/dL Final     GFR Estimate 07/08/2021 83  >60 mL/min/[1.73_m2] Final    Comment: Non  GFR Calc  Starting 12/18/2018, serum creatinine based estimated GFR (eGFR) will be   calculated using the Chronic Kidney Disease Epidemiology Collaboration   (CKD-EPI) equation.       GFR Estimate If Black 07/08/2021 >90  >60 mL/min/[1.73_m2] Final    Comment:  GFR Calc  Starting 12/18/2018, serum creatinine based estimated GFR (eGFR) will be   calculated using the Chronic Kidney Disease Epidemiology Collaboration   (CKD-EPI) equation.       Calcium 07/08/2021  8.9  8.5 - 10.1 mg/dL Final

## 2022-04-11 NOTE — PATIENT INSTRUCTIONS
You may schedule your mammogram at [facility] by calling [phone] and asking to schedule your mammogram or you may schedule with [facility] in [place] by calling [phone].

## 2022-04-12 ENCOUNTER — TELEPHONE (OUTPATIENT)
Dept: FAMILY MEDICINE | Facility: CLINIC | Age: 62
End: 2022-04-12
Payer: COMMERCIAL

## 2022-04-12 LAB
CREAT UR-MCNC: 53 MG/DL
MICROALBUMIN UR-MCNC: 19 MG/L
MICROALBUMIN/CREAT UR: 35.85 MG/G CR (ref 0–25)
VZV IGG SER QL IA: 86.1 INDEX
VZV IGG SER QL IA: NORMAL

## 2022-04-12 NOTE — TELEPHONE ENCOUNTER
----- Message from Disha Magaña MD sent at 4/11/2022  6:06 PM CDT -----  I think the reason for her symptoms is in the lab results.   I think it is due to low sodium which is a side effect of her medication.   I would like to switch from 50/12.5 to 100 mg and remove the hydrochlorothiazide altogether.   Recheck blood pressure with visit with JORDEN

## 2022-04-13 LAB
SARS-COV-2 AB SERPL IA-ACNC: <0.4 U/ML
SARS-COV-2 AB SERPL QL IA: NEGATIVE

## 2022-05-27 ENCOUNTER — OFFICE VISIT (OUTPATIENT)
Dept: FAMILY MEDICINE | Facility: CLINIC | Age: 62
End: 2022-05-27
Payer: COMMERCIAL

## 2022-05-27 VITALS
WEIGHT: 159 LBS | SYSTOLIC BLOOD PRESSURE: 128 MMHG | RESPIRATION RATE: 12 BRPM | BODY MASS INDEX: 26.49 KG/M2 | HEIGHT: 65 IN | OXYGEN SATURATION: 98 % | HEART RATE: 84 BPM | DIASTOLIC BLOOD PRESSURE: 84 MMHG | TEMPERATURE: 98.5 F

## 2022-05-27 DIAGNOSIS — I10 ESSENTIAL HYPERTENSION, BENIGN: ICD-10-CM

## 2022-05-27 DIAGNOSIS — Z00.00 ROUTINE GENERAL MEDICAL EXAMINATION AT A HEALTH CARE FACILITY: ICD-10-CM

## 2022-05-27 LAB
ANION GAP SERPL CALCULATED.3IONS-SCNC: 4 MMOL/L (ref 3–14)
BUN SERPL-MCNC: 12 MG/DL (ref 7–30)
CALCIUM SERPL-MCNC: 9 MG/DL (ref 8.5–10.1)
CHLORIDE BLD-SCNC: 102 MMOL/L (ref 94–109)
CHOLEST SERPL-MCNC: 177 MG/DL
CO2 SERPL-SCNC: 29 MMOL/L (ref 20–32)
CREAT SERPL-MCNC: 0.7 MG/DL (ref 0.52–1.04)
CREAT UR-MCNC: 64 MG/DL
FASTING STATUS PATIENT QL REPORTED: YES
GFR SERPL CREATININE-BSD FRML MDRD: >90 ML/MIN/1.73M2
GLUCOSE BLD-MCNC: 92 MG/DL (ref 70–99)
HDLC SERPL-MCNC: 55 MG/DL
LDLC SERPL CALC-MCNC: 99 MG/DL
MICROALBUMIN UR-MCNC: 17 MG/L
MICROALBUMIN/CREAT UR: 26.56 MG/G CR (ref 0–25)
NONHDLC SERPL-MCNC: 122 MG/DL
POTASSIUM BLD-SCNC: 4.3 MMOL/L (ref 3.4–5.3)
SODIUM SERPL-SCNC: 135 MMOL/L (ref 133–144)
TRIGL SERPL-MCNC: 116 MG/DL

## 2022-05-27 PROCEDURE — 80048 BASIC METABOLIC PNL TOTAL CA: CPT | Performed by: PHYSICIAN ASSISTANT

## 2022-05-27 PROCEDURE — 82043 UR ALBUMIN QUANTITATIVE: CPT | Performed by: PHYSICIAN ASSISTANT

## 2022-05-27 PROCEDURE — 99396 PREV VISIT EST AGE 40-64: CPT | Performed by: PHYSICIAN ASSISTANT

## 2022-05-27 PROCEDURE — 80061 LIPID PANEL: CPT | Performed by: PHYSICIAN ASSISTANT

## 2022-05-27 PROCEDURE — 36415 COLL VENOUS BLD VENIPUNCTURE: CPT | Performed by: PHYSICIAN ASSISTANT

## 2022-05-27 RX ORDER — LOSARTAN POTASSIUM 100 MG/1
100 TABLET ORAL DAILY
Qty: 90 TABLET | Refills: 3 | Status: SHIPPED | OUTPATIENT
Start: 2022-05-27 | End: 2023-06-30

## 2022-05-27 RX ORDER — LOSARTAN POTASSIUM AND HYDROCHLOROTHIAZIDE 12.5; 5 MG/1; MG/1
TABLET ORAL
COMMUNITY
Start: 2022-04-11 | End: 2022-05-27 | Stop reason: ALTCHOICE

## 2022-05-27 SDOH — ECONOMIC STABILITY: INCOME INSECURITY: HOW HARD IS IT FOR YOU TO PAY FOR THE VERY BASICS LIKE FOOD, HOUSING, MEDICAL CARE, AND HEATING?: NOT HARD AT ALL

## 2022-05-27 SDOH — ECONOMIC STABILITY: FOOD INSECURITY: WITHIN THE PAST 12 MONTHS, THE FOOD YOU BOUGHT JUST DIDN'T LAST AND YOU DIDN'T HAVE MONEY TO GET MORE.: NEVER TRUE

## 2022-05-27 SDOH — ECONOMIC STABILITY: TRANSPORTATION INSECURITY
IN THE PAST 12 MONTHS, HAS LACK OF TRANSPORTATION KEPT YOU FROM MEETINGS, WORK, OR FROM GETTING THINGS NEEDED FOR DAILY LIVING?: NO

## 2022-05-27 SDOH — ECONOMIC STABILITY: FOOD INSECURITY: WITHIN THE PAST 12 MONTHS, YOU WORRIED THAT YOUR FOOD WOULD RUN OUT BEFORE YOU GOT MONEY TO BUY MORE.: SOMETIMES TRUE

## 2022-05-27 SDOH — ECONOMIC STABILITY: INCOME INSECURITY: IN THE LAST 12 MONTHS, WAS THERE A TIME WHEN YOU WERE NOT ABLE TO PAY THE MORTGAGE OR RENT ON TIME?: NO

## 2022-05-27 SDOH — HEALTH STABILITY: PHYSICAL HEALTH: ON AVERAGE, HOW MANY MINUTES DO YOU ENGAGE IN EXERCISE AT THIS LEVEL?: 30 MIN

## 2022-05-27 SDOH — ECONOMIC STABILITY: TRANSPORTATION INSECURITY
IN THE PAST 12 MONTHS, HAS THE LACK OF TRANSPORTATION KEPT YOU FROM MEDICAL APPOINTMENTS OR FROM GETTING MEDICATIONS?: NO

## 2022-05-27 SDOH — HEALTH STABILITY: PHYSICAL HEALTH: ON AVERAGE, HOW MANY DAYS PER WEEK DO YOU ENGAGE IN MODERATE TO STRENUOUS EXERCISE (LIKE A BRISK WALK)?: 3 DAYS

## 2022-05-27 ASSESSMENT — SOCIAL DETERMINANTS OF HEALTH (SDOH)
HOW OFTEN DO YOU GET TOGETHER WITH FRIENDS OR RELATIVES?: PATIENT DECLINED
DO YOU BELONG TO ANY CLUBS OR ORGANIZATIONS SUCH AS CHURCH GROUPS UNIONS, FRATERNAL OR ATHLETIC GROUPS, OR SCHOOL GROUPS?: PATIENT DECLINED
HOW OFTEN DO YOU ATTEND CHURCH OR RELIGIOUS SERVICES?: PATIENT DECLINED
IN A TYPICAL WEEK, HOW MANY TIMES DO YOU TALK ON THE PHONE WITH FAMILY, FRIENDS, OR NEIGHBORS?: MORE THAN THREE TIMES A WEEK

## 2022-05-27 ASSESSMENT — ENCOUNTER SYMPTOMS
CHILLS: 0
FEVER: 0
HEARTBURN: 0
SORE THROAT: 0
HEMATOCHEZIA: 0
SHORTNESS OF BREATH: 0
ABDOMINAL PAIN: 0
COUGH: 0
NAUSEA: 0
EYE PAIN: 0
NERVOUS/ANXIOUS: 0
BREAST MASS: 0
DYSURIA: 0
HEMATURIA: 0
WEAKNESS: 0
FREQUENCY: 0
MYALGIAS: 0
DIZZINESS: 0
CONSTIPATION: 0
PARESTHESIAS: 0
DIARRHEA: 0
PALPITATIONS: 0
ARTHRALGIAS: 1
HEADACHES: 0
JOINT SWELLING: 0

## 2022-05-27 ASSESSMENT — LIFESTYLE VARIABLES
HOW OFTEN DO YOU HAVE SIX OR MORE DRINKS ON ONE OCCASION: NEVER
AUDIT-C TOTAL SCORE: 1
HOW OFTEN DO YOU HAVE A DRINK CONTAINING ALCOHOL: MONTHLY OR LESS
SKIP TO QUESTIONS 9-10: 1
HOW MANY STANDARD DRINKS CONTAINING ALCOHOL DO YOU HAVE ON A TYPICAL DAY: 1 OR 2

## 2022-05-27 NOTE — PROGRESS NOTES
SUBJECTIVE:   CC: Claudia Ellison is an 62 year old woman who presents for preventive health visit.     Healthy Habits:     Getting at least 3 servings of Calcium per day:  Yes    Bi-annual eye exam:  NO    Dental care twice a year:  NO    Sleep apnea or symptoms of sleep apnea:  None    Diet:  Regular (no restrictions)    Frequency of exercise:  6-7 days/week    Duration of exercise:  15-30 minutes    Taking medications regularly:  Yes    Medication side effects:  None    PHQ-2 Total Score: 0    Additional concerns today:  No        Today's PHQ-2 Score:   PHQ-2 ( 1999 Pf[employer]) 5/27/2022   Q1: Little interest or pleasure in doing things 0   Q2: Feeling down, depressed or hopeless 0   PHQ-2 Score 0   PHQ-2 Total Score (12-17 Years)- Positive if 3 or more points; Administer PHQ-A if positive -   Q1: Little interest or pleasure in doing things Not at all   Q2: Feeling down, depressed or hopeless Not at all   PHQ-2 Score 0       Abuse: Current or Past (Physical, Sexual or Emotional) - No  Do you feel safe in your environment? Yes    Have you ever done Advance Care Planning? (For example, a Health Directive, POLST, or a discussion with a medical provider or your loved ones about your wishes): No, advance care planning information given to patient to review.  Patient declined advance care planning discussion at this time.    Social History     Tobacco Use     Smoking status: Never Smoker     Smokeless tobacco: Never Used   Substance Use Topics     Alcohol use: Yes     Comment: Once a week or less         Alcohol Use 5/27/2022   Prescreen: >3 drinks/day or >7 drinks/week? No   Prescreen: >3 drinks/day or >7 drinks/week? -       Reviewed orders with patient.  Reviewed health maintenance and updated orders accordingly - Yes  Lab work is in process    Breast Cancer Screening:  Any new diagnosis of family breast, ovarian, or bowel cancer? No    FHS-7: No flowsheet data found.    Mammogram Screening: Recommended mammography  every 1-2 years with patient discussion and risk factor consideration  Pertinent mammograms are reviewed under the imaging tab.    History of abnormal Pap smear: NO - age 30-65 PAP every 5 years with negative HPV co-testing recommended     Reviewed and updated as needed this visit by clinical staff   Tobacco  Allergies  Meds   Med Hx  Surg Hx  Fam Hx  Soc Hx          Reviewed and updated as needed this visit by Provider                   Past Medical History:   Diagnosis Date     CARDIOVASCULAR SCREENING; LDL GOAL LESS THAN 160 10/31/2010     Essential hypertension, benign 12/16/2015     History of blood transfusion At birth     HYPOTHYROIDISM NOS 06/04/2007     Lateral epicondylitis 04/26/2007     Maternal varicella, non-immune 04/13/2022    NOT immune - needs vaccine     Microalbuminuria 10/10/2014     Vega's neuroma      Plantar fasciitis      Tubular adenoma of colon         Review of Systems   Constitutional: Negative for chills and fever.   HENT: Negative for congestion, ear pain, hearing loss and sore throat.    Eyes: Negative for pain and visual disturbance.   Respiratory: Negative for cough and shortness of breath.    Cardiovascular: Negative for chest pain, palpitations and peripheral edema.   Gastrointestinal: Negative for abdominal pain, constipation, diarrhea, heartburn, hematochezia and nausea.   Breasts:  Negative for tenderness, breast mass and discharge.   Genitourinary: Negative for dysuria, frequency, genital sores, hematuria, pelvic pain, urgency, vaginal bleeding and vaginal discharge.   Musculoskeletal: Positive for arthralgias. Negative for joint swelling and myalgias.   Skin: Negative for rash.   Neurological: Negative for dizziness, weakness, headaches and paresthesias.   Psychiatric/Behavioral: Negative for mood changes. The patient is not nervous/anxious.           OBJECTIVE:   /86 (BP Location: Right arm, Patient Position: Sitting, Cuff Size: Adult Large)   Pulse 84   Temp  "98.5  F (36.9  C) (Oral)   Resp 12   Ht 1.638 m (5' 4.5\")   Wt 72.1 kg (159 lb)   SpO2 98%   BMI 26.87 kg/m    Physical Exam  GENERAL APPEARANCE: healthy, alert and no distress  EYES: Eyes grossly normal to inspection, PERRL and conjunctivae and sclerae normal  HENT: ear canals and TM's normal, nose and mouth without ulcers or lesions, oropharynx clear and oral mucous membranes moist  NECK: no adenopathy, no asymmetry, masses, or scars and thyroid normal to palpation  CV: regular rate and rhythm, normal S1 S2, no S3 or S4, no murmur, click or rub, no peripheral edema and peripheral pulses strong  ABDOMEN: soft, nontender, no hepatosplenomegaly, no masses and bowel sounds normal  MS: no musculoskeletal defects are noted and gait is age appropriate without ataxia  SKIN: no suspicious lesions or rashes  NEURO: Normal strength and tone, sensory exam grossly normal, mentation intact and speech normal  PSYCH: mentation appears normal and affect normal/bright        ASSESSMENT/PLAN:   (Z00.00) Routine general medical examination at a health care facility  Comment: declined covid and tdap vaccines  Plan: Lipid panel reflex to direct LDL Fasting, Basic        metabolic panel  (Ca, Cl, CO2, Creat, Gluc, K,         Na, BUN)            (I10) Essential hypertension, benign  Comment: stable. Await labs  Plan: Basic metabolic panel  (Ca, Cl, CO2, Creat,         Gluc, K, Na, BUN), Albumin Random Urine         Quantitative with Creat Ratio, losartan         (COZAAR) 100 MG tablet              Patient has been advised of split billing requirements and indicates understanding: Yes    COUNSELING:  Reviewed preventive health counseling, as reflected in patient instructions       Regular exercise       Healthy diet/nutrition       Vision screening    Estimated body mass index is 26.87 kg/m  as calculated from the following:    Height as of this encounter: 1.638 m (5' 4.5\").    Weight as of this encounter: 72.1 kg (159 lb).    Weight " management plan: Discussed healthy diet and exercise guidelines    She reports that she has never smoked. She has never used smokeless tobacco.      Counseling Resources:  ATP IV Guidelines  Pooled Cohorts Equation Calculator  Breast Cancer Risk Calculator  BRCA-Related Cancer Risk Assessment: FHS-7 Tool  FRAX Risk Assessment  ICSI Preventive Guidelines  Dietary Guidelines for Americans, 2010  USDA's MyPlate  ASA Prophylaxis  Lung CA Screening    FARNAZ [facility]

## 2022-05-27 NOTE — Clinical Note
Please abstract the following data from this visit with this patient into the appropriate field in Epic:  Tests that can be patient reported without a hard copy:  Mammogram done on this date: 3/17/2021 (approximately), by this group: [facility], results were normal.  and Pap smear done on this date: 3/2021 (approximately), by this group: [facility], results were normal.

## 2022-08-04 ENCOUNTER — TRANSFERRED RECORDS (OUTPATIENT)
Dept: FAMILY MEDICINE | Facility: CLINIC | Age: 62
End: 2022-08-04

## 2022-10-09 ENCOUNTER — HEALTH MAINTENANCE LETTER (OUTPATIENT)
Age: 62
End: 2022-10-09

## 2023-03-31 DIAGNOSIS — E03.9 ACQUIRED HYPOTHYROIDISM: ICD-10-CM

## 2023-03-31 RX ORDER — LEVOTHYROXINE SODIUM 100 UG/1
TABLET ORAL
Qty: 90 TABLET | Refills: 0 | Status: SHIPPED | OUTPATIENT
Start: 2023-03-31 | End: 2023-07-03

## 2023-03-31 NOTE — TELEPHONE ENCOUNTER
Medication is being filled for 1 time refill only due to:  Patient needs to be seen because need annual exam.   Hilda Bee RN

## 2023-04-04 ENCOUNTER — MYC REFILL (OUTPATIENT)
Dept: FAMILY MEDICINE | Facility: CLINIC | Age: 63
End: 2023-04-04
Payer: COMMERCIAL

## 2023-04-04 DIAGNOSIS — E03.9 ACQUIRED HYPOTHYROIDISM: ICD-10-CM

## 2023-04-05 RX ORDER — LEVOTHYROXINE SODIUM 100 UG/1
TABLET ORAL
Qty: 90 TABLET | Refills: 0 | OUTPATIENT
Start: 2023-04-05

## 2023-06-30 DIAGNOSIS — I10 ESSENTIAL HYPERTENSION, BENIGN: ICD-10-CM

## 2023-06-30 RX ORDER — LOSARTAN POTASSIUM 100 MG/1
100 TABLET ORAL DAILY
Qty: 90 TABLET | Refills: 0 | Status: SHIPPED | OUTPATIENT
Start: 2023-06-30 | End: 2023-08-24

## 2023-07-03 ENCOUNTER — MYC REFILL (OUTPATIENT)
Dept: FAMILY MEDICINE | Facility: CLINIC | Age: 63
End: 2023-07-03
Payer: COMMERCIAL

## 2023-07-03 DIAGNOSIS — I10 ESSENTIAL HYPERTENSION, BENIGN: ICD-10-CM

## 2023-07-03 DIAGNOSIS — E03.9 ACQUIRED HYPOTHYROIDISM: ICD-10-CM

## 2023-07-05 RX ORDER — LEVOTHYROXINE SODIUM 100 UG/1
TABLET ORAL
Qty: 30 TABLET | Refills: 0 | Status: SHIPPED | OUTPATIENT
Start: 2023-07-05 | End: 2023-08-04

## 2023-07-05 RX ORDER — LOSARTAN POTASSIUM 100 MG/1
100 TABLET ORAL DAILY
Qty: 90 TABLET | Refills: 0 | OUTPATIENT
Start: 2023-07-05

## 2023-07-05 NOTE — TELEPHONE ENCOUNTER
Routing refill request to provider for review/approval because:  Rebecca given x1 and patient did not follow up, please advise  Patient needs to be seen because it has been more than 1 year since last office visit.  Labs not current: TSH    Ezio GAYTAN RN 7/5/2023 at 2:11 PM

## 2023-07-19 SDOH — HEALTH STABILITY: PHYSICAL HEALTH: ON AVERAGE, HOW MANY MINUTES DO YOU ENGAGE IN EXERCISE AT THIS LEVEL?: 60 MIN

## 2023-07-19 SDOH — ECONOMIC STABILITY: FOOD INSECURITY: WITHIN THE PAST 12 MONTHS, THE FOOD YOU BOUGHT JUST DIDN'T LAST AND YOU DIDN'T HAVE MONEY TO GET MORE.: NEVER TRUE

## 2023-07-19 SDOH — ECONOMIC STABILITY: FOOD INSECURITY: WITHIN THE PAST 12 MONTHS, YOU WORRIED THAT YOUR FOOD WOULD RUN OUT BEFORE YOU GOT MONEY TO BUY MORE.: NEVER TRUE

## 2023-07-19 SDOH — ECONOMIC STABILITY: INCOME INSECURITY: HOW HARD IS IT FOR YOU TO PAY FOR THE VERY BASICS LIKE FOOD, HOUSING, MEDICAL CARE, AND HEATING?: NOT HARD AT ALL

## 2023-07-19 SDOH — ECONOMIC STABILITY: INCOME INSECURITY: IN THE LAST 12 MONTHS, WAS THERE A TIME WHEN YOU WERE NOT ABLE TO PAY THE MORTGAGE OR RENT ON TIME?: NO

## 2023-07-19 SDOH — HEALTH STABILITY: PHYSICAL HEALTH: ON AVERAGE, HOW MANY DAYS PER WEEK DO YOU ENGAGE IN MODERATE TO STRENUOUS EXERCISE (LIKE A BRISK WALK)?: 2 DAYS

## 2023-07-19 ASSESSMENT — SOCIAL DETERMINANTS OF HEALTH (SDOH)
IN A TYPICAL WEEK, HOW MANY TIMES DO YOU TALK ON THE PHONE WITH FAMILY, FRIENDS, OR NEIGHBORS?: MORE THAN THREE TIMES A WEEK
HOW OFTEN DO YOU GET TOGETHER WITH FRIENDS OR RELATIVES?: PATIENT DECLINED
HOW OFTEN DO YOU ATTEND CHURCH OR RELIGIOUS SERVICES?: PATIENT DECLINED
DO YOU BELONG TO ANY CLUBS OR ORGANIZATIONS SUCH AS CHURCH GROUPS UNIONS, FRATERNAL OR ATHLETIC GROUPS, OR SCHOOL GROUPS?: YES

## 2023-07-19 ASSESSMENT — LIFESTYLE VARIABLES
HOW OFTEN DO YOU HAVE A DRINK CONTAINING ALCOHOL: MONTHLY OR LESS
SKIP TO QUESTIONS 9-10: 1
HOW MANY STANDARD DRINKS CONTAINING ALCOHOL DO YOU HAVE ON A TYPICAL DAY: 1 OR 2
HOW OFTEN DO YOU HAVE SIX OR MORE DRINKS ON ONE OCCASION: NEVER
AUDIT-C TOTAL SCORE: 1

## 2023-07-20 ASSESSMENT — ENCOUNTER SYMPTOMS
NAUSEA: 0
DIZZINESS: 0
SORE THROAT: 0
FREQUENCY: 0
ARTHRALGIAS: 0
HEARTBURN: 1
DIARRHEA: 0
HEMATOCHEZIA: 0
WEAKNESS: 0
HEMATURIA: 0
CONSTIPATION: 1
CHILLS: 0
ABDOMINAL PAIN: 0
HEADACHES: 0
DYSURIA: 0
NERVOUS/ANXIOUS: 0
PALPITATIONS: 0
MYALGIAS: 0
JOINT SWELLING: 0
PARESTHESIAS: 0
BREAST MASS: 0
EYE PAIN: 0
SHORTNESS OF BREATH: 0
FEVER: 0
COUGH: 0

## 2023-08-04 DIAGNOSIS — E03.9 ACQUIRED HYPOTHYROIDISM: ICD-10-CM

## 2023-08-04 RX ORDER — LEVOTHYROXINE SODIUM 100 UG/1
TABLET ORAL
Qty: 30 TABLET | Refills: 0 | Status: SHIPPED | OUTPATIENT
Start: 2023-08-04 | End: 2023-08-24

## 2023-08-04 NOTE — TELEPHONE ENCOUNTER
Routing refill request to provider for review/approval because:  Rebecca given x1 and patient did not follow up, please advise  Labs not current:  TSH    Yahaira MORENO RN   PAL (Patient Advocate Liaison)  [facility]  [phone]

## 2023-08-24 ENCOUNTER — OFFICE VISIT (OUTPATIENT)
Dept: FAMILY MEDICINE | Facility: CLINIC | Age: 63
End: 2023-08-24
Payer: COMMERCIAL

## 2023-08-24 ENCOUNTER — MYC MEDICAL ADVICE (OUTPATIENT)
Dept: FAMILY MEDICINE | Facility: CLINIC | Age: 63
End: 2023-08-24

## 2023-08-24 VITALS
BODY MASS INDEX: 26.49 KG/M2 | SYSTOLIC BLOOD PRESSURE: 156 MMHG | HEART RATE: 81 BPM | RESPIRATION RATE: 12 BRPM | DIASTOLIC BLOOD PRESSURE: 92 MMHG | OXYGEN SATURATION: 95 % | HEIGHT: 65 IN | WEIGHT: 159 LBS | TEMPERATURE: 98.3 F

## 2023-08-24 DIAGNOSIS — N89.8 VAGINAL ITCHING: ICD-10-CM

## 2023-08-24 DIAGNOSIS — R80.9 MICROALBUMINURIA: ICD-10-CM

## 2023-08-24 DIAGNOSIS — E03.9 ACQUIRED HYPOTHYROIDISM: ICD-10-CM

## 2023-08-24 DIAGNOSIS — I10 ESSENTIAL HYPERTENSION, BENIGN: ICD-10-CM

## 2023-08-24 DIAGNOSIS — Z13.220 SCREENING FOR LIPID DISORDERS: ICD-10-CM

## 2023-08-24 DIAGNOSIS — Z00.00 ROUTINE GENERAL MEDICAL EXAMINATION AT A HEALTH CARE FACILITY: Primary | ICD-10-CM

## 2023-08-24 DIAGNOSIS — R19.5 OTHER FECAL ABNORMALITIES: ICD-10-CM

## 2023-08-24 DIAGNOSIS — N95.2 VAGINAL ATROPHY: ICD-10-CM

## 2023-08-24 DIAGNOSIS — R23.9 SKIN CHANGE: ICD-10-CM

## 2023-08-24 PROCEDURE — 82043 UR ALBUMIN QUANTITATIVE: CPT

## 2023-08-24 PROCEDURE — 84439 ASSAY OF FREE THYROXINE: CPT

## 2023-08-24 PROCEDURE — 99214 OFFICE O/P EST MOD 30 MIN: CPT | Mod: 25

## 2023-08-24 PROCEDURE — 80061 LIPID PANEL: CPT

## 2023-08-24 PROCEDURE — 99396 PREV VISIT EST AGE 40-64: CPT

## 2023-08-24 PROCEDURE — 80053 COMPREHEN METABOLIC PANEL: CPT

## 2023-08-24 PROCEDURE — 84443 ASSAY THYROID STIM HORMONE: CPT

## 2023-08-24 PROCEDURE — 82570 ASSAY OF URINE CREATININE: CPT

## 2023-08-24 PROCEDURE — 36415 COLL VENOUS BLD VENIPUNCTURE: CPT

## 2023-08-24 RX ORDER — LOSARTAN POTASSIUM 100 MG/1
100 TABLET ORAL DAILY
Qty: 90 TABLET | Refills: 3 | Status: SHIPPED | OUTPATIENT
Start: 2023-08-24 | End: 2024-10-02

## 2023-08-24 RX ORDER — HYDROCORTISONE 25 MG/G
OINTMENT TOPICAL 2 TIMES DAILY
Qty: 30 G | Refills: 1 | Status: SHIPPED | OUTPATIENT
Start: 2023-08-24

## 2023-08-24 RX ORDER — ESTRADIOL 10 UG/1
10 INSERT VAGINAL
Qty: 24 TABLET | Refills: 3 | Status: SHIPPED | OUTPATIENT
Start: 2023-08-24

## 2023-08-24 RX ORDER — LEVOTHYROXINE SODIUM 100 UG/1
TABLET ORAL
Qty: 90 TABLET | Refills: 3 | Status: SHIPPED | OUTPATIENT
Start: 2023-08-24 | End: 2023-08-28

## 2023-08-24 ASSESSMENT — ENCOUNTER SYMPTOMS
NAUSEA: 0
WEAKNESS: 0
COUGH: 0
CHILLS: 0
FREQUENCY: 0
HEMATOCHEZIA: 0
NERVOUS/ANXIOUS: 0
ABDOMINAL PAIN: 0
MYALGIAS: 0
PARESTHESIAS: 0
EYE PAIN: 0
DIARRHEA: 0
ARTHRALGIAS: 0
DYSURIA: 0
DIZZINESS: 0
PALPITATIONS: 0
CONSTIPATION: 1
HEARTBURN: 1
JOINT SWELLING: 0
FEVER: 0
SHORTNESS OF BREATH: 0
BREAST MASS: 0
HEADACHES: 0
HEMATURIA: 0
SORE THROAT: 0

## 2023-08-24 NOTE — PROGRESS NOTES
SUBJECTIVE:   CC: Claudia is an 63 year old who presents for preventive health visit.     Healthy Habits:     Getting at least 3 servings of Calcium per day:  Yes    Bi-annual eye exam:  Yes    Dental care twice a year:  Yes    Sleep apnea or symptoms of sleep apnea:  None    Diet:  Regular (no restrictions)    Frequency of exercise:  6-7 days/week    Duration of exercise:  15-30 minutes    Taking medications regularly:  Yes    Medication side effects:  Not applicable    Additional concerns today:  Yes    Rectal Concern: For the past several weeks the patient has had trouble passing stools. She feels the need to defecate but then she will sit and have a hard time getting stool out. Does not feel the stool is harder than normal, has been soft and unchanged from baseline. No blood in the stool. No rectal pain. She has not been seen for this previously. She is wondering about colonoscopy screening as she has had polyps in the past and told she needs follow up every 3 years. Wondering if polyps are causing problems with stools. She does have occasional LLQ abdominal discomfort but no abdominal pain, nausea, vomiting, fevers or chills.     Vaginal Itchiness: Has an area on the left labia majora that is itchy and she has noticed skin changes in the area. This has been ongoing for a year but intermittent. The skin changes seem to always be present but it will only occasionally be itchy. Has not been seen of this previously. No vaginal discharge or other abnormalities.     Hypertension: Taking losartan but did not take this morning as she wanted to be fasting for her appointment. No issues with lightheadedness, dizziness, headaches, visionary changes or other symptoms. No new side effects of the medication.     Hypothyroidism: Taking levothyroxine daily. No side effects of the medication. No symptoms of hyper/hypothyroidism.     Wondering if due for other screenings: pap smear, mammogram, etc.     Social History      Tobacco Use     Smoking status: Never     Smokeless tobacco: Never   Substance Use Topics     Alcohol use: Yes     Comment: Once a week or less         2023     1:33 PM   Alcohol Use   Prescreen: >3 drinks/day or >7 drinks/week? Not Applicable    Not Applicable     Reviewed orders with patient.  Reviewed health maintenance and updated orders accordingly - Yes  BP Readings from Last 3 Encounters:   23 (!) 156/92   22 128/84   22 132/80    Wt Readings from Last 3 Encounters:   23 72.1 kg (159 lb)   22 72.1 kg (159 lb)   22 71.7 kg (158 lb)                  Patient Active Problem List   Diagnosis     Lateral epicondylitis     Female climacteric     CARDIOVASCULAR SCREENING; LDL GOAL LESS THAN 160     Tubular adenoma of colon     Microalbuminuria     Essential hypertension, benign     Acquired hypothyroidism     Past Surgical History:   Procedure Laterality Date     BIOPSY      Mother     CARDIAC SURGERY      Mother     CHOLECYSTECTOMY       COLONOSCOPY N/A 10/22/2019    lela Pruitt in 5 years     SOFT TISSUE SURGERY      Myself       Social History     Tobacco Use     Smoking status: Never     Smokeless tobacco: Never   Substance Use Topics     Alcohol use: Yes     Comment: Once a week or less     Family History   Problem Relation Age of Onset     Respiratory Mother         Asthma     Eye Disorder Mother         macular degeneration     Lipids Mother      Hypertension Mother      Other Cancer Mother         Lung     Asthma Mother      Osteoporosis Mother      Diabetes Type 2  Mother      Musculoskeletal Disorder Father         ALS;      Lipids Brother         hypercholestermia     Depression Son      Hypertension Maternal Half-Brother      Cerebrovascular Disease Maternal Half-Brother 65     Hyperlipidemia Maternal Half-Brother      Cerebrovascular Disease Maternal Half-Brother          Current Outpatient Medications   Medication Sig Dispense Refill     estradiol  (VAGIFEM) 10 MCG TABS vaginal tablet Place 1 tablet (10 mcg) vaginally twice a week 24 tablet 3     levothyroxine (SYNTHROID/LEVOTHROID) 100 MCG tablet TAKE ONE TABLET BY MOUTH ONE TIME DAILY 90 tablet 3     losartan (COZAAR) 100 MG tablet Take 1 tablet (100 mg) by mouth daily 90 tablet 3     Biotin 5000 MCG CAPS Take 1 tablet by mouth daily 30 capsule 0     CHELATED MAGNESIUM PO Take 250 mg by mouth       cholecalciferol (VITAMIN D3) 5000 UNITS CAPS capsule Take 1 capsule (5,000 Units) by mouth every other day 15 capsule 0     Krill Oil 350 MG CAPS        Lutein-Zeaxanthin 25-5 MG CAPS        Menaquinone-7 (VITAMIN K2 PO)        Misc Natural Products (LUTEIN 20) CAPS Take 1 tablet by mouth daily Reported on 3/23/2017 30 capsule 0     Omega-3 Fatty Acids (OMEGA-3 FISH OIL PO)        order for DME Equipment being ordered: Automatic blood pressure cuff 1 each 0     UNABLE TO FIND Reported on 3/23/2017       Allergies   Allergen Reactions     No Known Drug Allergy        Breast Cancer Screenin/27/2022     7:20 AM   Breast CA Risk Assessment (FHS-7)   Do you have a family history of breast, colon, or ovarian cancer? No / Unknown         Mammogram Screening: Recommended mammography every 1-2 years with patient discussion and risk factor consideration  Pertinent mammograms are reviewed under the imaging tab.    History of abnormal Pap smear: NO - age 30-65 PAP every 3 years with negative HPV co-testing recommended. Last pap smear in .        Reviewed and updated as needed this visit by clinical staff   Tobacco  Allergies  Meds  Problems  Med Hx  Surg Hx  Fam Hx          Reviewed and updated as needed this visit by Provider   Tobacco  Allergies  Meds  Problems  Med Hx  Surg Hx  Fam Hx         Past Medical History:   Diagnosis Date     CARDIOVASCULAR SCREENING; LDL GOAL LESS THAN 160 10/31/2010     Essential hypertension, benign 2015     History of blood transfusion At birth      "HYPOTHYROIDISM NOS 06/04/2007     Lateral epicondylitis 04/26/2007     Maternal varicella, non-immune 04/13/2022    NOT immune - needs vaccine     Microalbuminuria 10/10/2014     Vega's neuroma      Plantar fasciitis      Tubular adenoma of colon       Past Surgical History:   Procedure Laterality Date     BIOPSY      Mother     CARDIAC SURGERY      Mother     CHOLECYSTECTOMY       COLONOSCOPY N/A 10/22/2019    lela Pruitt in 5 years     SOFT TISSUE SURGERY      Myself       Review of Systems   Constitutional: Negative for chills and fever.   HENT: Negative for congestion, ear pain, hearing loss and sore throat.    Eyes: Negative for pain and visual disturbance.   Respiratory: Negative for cough and shortness of breath.    Cardiovascular: Negative for chest pain, palpitations and peripheral edema.   Gastrointestinal: Positive for constipation and heartburn. Negative for abdominal pain, diarrhea, hematochezia and nausea.   Breasts:  Negative for tenderness, breast mass and discharge.   Genitourinary: Negative for dysuria, frequency, genital sores, hematuria, pelvic pain, urgency, vaginal bleeding and vaginal discharge.   Musculoskeletal: Negative for arthralgias, joint swelling and myalgias.   Skin: Negative for rash.   Neurological: Negative for dizziness, weakness, headaches and paresthesias.   Psychiatric/Behavioral: Negative for mood changes. The patient is not nervous/anxious.      OBJECTIVE:   BP (!) 156/92 (BP Location: Right arm)   Pulse 81   Temp 98.3  F (36.8  C) (Oral)   Resp 12   Ht 1.651 m (5' 5\")   Wt 72.1 kg (159 lb)   SpO2 95%   BMI 26.46 kg/m    Physical Exam  GENERAL: healthy, alert and no distress  EYES: Eyes grossly normal to inspection, PERRL and conjunctivae and sclerae normal  HENT: ear canals and TM's normal, nose and mouth without ulcers or lesions  NECK: no adenopathy, no asymmetry, masses, or scars and thyroid normal to palpation  RESP: lungs clear to auscultation - no rales, " rhonchi or wheezes  CV: regular rate and rhythm, normal S1 S2, no S3 or S4, no murmur, click or rub  ABDOMEN: soft, nontender, no masses and bowel sounds normal  MS: no gross musculoskeletal defects noted, no edema  : External exam performed. Area of erythema and plaque like rash on the left lower labia majora with one open area that appears almost like a bug bite. No discharge from the area. No other skin changes. No vaginal discharge. Rectal exam with normal rectal tone. Small external hemorrhoid, non-bleeding. No internal hemorrhoids on MELVIN.  NEURO: Normal strength and tone, mentation intact and speech normal  PSYCH: mentation appears normal, affect normal/bright    Diagnostic Test Results:  Labs reviewed in Epic    ASSESSMENT/PLAN:   (Z00.00) Routine general medical examination at a health care facility  (primary encounter diagnosis)  Stable exam. Routine screening labs, patient fasting today. Follow up in 1 year for annual wellness; sooner as needed for acute concerns.  Plan: Lipid panel reflex to direct LDL Fasting,         Comprehensive metabolic panel (BMP + Alb, Alk         Phos, ALT, AST, Total. Bili, TP), TSH with free        T4 reflex, Colonoscopy Screening          Referral    (R80.9) Microalbuminuria  Plan: Albumin Random Urine Quantitative with Creat         Ratio    (E03.9) Acquired hypothyroidism  Check TSH today. Refill given, will update if changes need to be made with TSH level. No side effects of the medication.  Plan: TSH with free T4 reflex, levothyroxine         (SYNTHROID/LEVOTHROID) 100 MCG tablet         (I10) Essential hypertension, benign  Patient did not take her BP medication this morning as she was fasting, likely cause of elevated pressure today. Check CMP. Tolerating losartan without new side effects. Refill provided.  Plan: losartan (COZAAR) 100 MG tablet         (Z13.220) Screening for lipid disorders  Plan: Lipid panel reflex to direct LDL Fasting    (N95.2) Vaginal  atrophy  Estradiol vaginal tablet refilled. Working well and stable.   Plan: estradiol (VAGIFEM) 10 MCG TABS vaginal tablet    (R19.5) Other fecal abnormalities  Normal rectal examination. No constipation. Recommended drinking prune juice to help soften stool to see if this helps. Will also order colonoscopy as she is due for routine screening and to check for any polyps or other abnormalities. She denies night sweats, weight loss, significant pain, fevers/chills or other concerning signs/symptoms that would warrant imaging at this time. May want to consider abdominal/pelvic imaging in the future if symptoms not improving and nothing obvious on colonoscopy.  Plan: Colonoscopy Screening  Referral    (N89.8) Vaginal itching  (R23.9) Skin change  Seems consistent with possible lichen planus given duration of symptoms, history and examination but will have her follow up with GYN for second opinion. Will have her try topical steroid cream intermittently to see if improvement.     Patient has been advised of split billing requirements and indicates understanding: Yes    COUNSELING:  Reviewed preventive health counseling, as reflected in patient instructions    She reports that she has never smoked. She has never used smokeless tobacco.          Jeanette Lorenzo PA-C  [facility]

## 2023-08-25 LAB
ALBUMIN SERPL BCG-MCNC: 4.5 G/DL (ref 3.5–5.2)
ALP SERPL-CCNC: 79 U/L (ref 35–104)
ALT SERPL W P-5'-P-CCNC: 19 U/L (ref 0–50)
ANION GAP SERPL CALCULATED.3IONS-SCNC: 13 MMOL/L (ref 7–15)
AST SERPL W P-5'-P-CCNC: 29 U/L (ref 0–45)
BILIRUB SERPL-MCNC: 0.8 MG/DL
BUN SERPL-MCNC: 14.2 MG/DL (ref 8–23)
CALCIUM SERPL-MCNC: 9.4 MG/DL (ref 8.8–10.2)
CHLORIDE SERPL-SCNC: 99 MMOL/L (ref 98–107)
CHOLEST SERPL-MCNC: 198 MG/DL
CREAT SERPL-MCNC: 0.8 MG/DL (ref 0.51–0.95)
CREAT UR-MCNC: 68.8 MG/DL
DEPRECATED HCO3 PLAS-SCNC: 24 MMOL/L (ref 22–29)
GFR SERPL CREATININE-BSD FRML MDRD: 82 ML/MIN/1.73M2
GLUCOSE SERPL-MCNC: 89 MG/DL (ref 70–99)
HDLC SERPL-MCNC: 51 MG/DL
LDLC SERPL CALC-MCNC: 126 MG/DL
MICROALBUMIN UR-MCNC: 20 MG/L
MICROALBUMIN/CREAT UR: 29.07 MG/G CR (ref 0–25)
NONHDLC SERPL-MCNC: 147 MG/DL
POTASSIUM SERPL-SCNC: 4.8 MMOL/L (ref 3.4–5.3)
PROT SERPL-MCNC: 7 G/DL (ref 6.4–8.3)
SODIUM SERPL-SCNC: 136 MMOL/L (ref 136–145)
T4 FREE SERPL-MCNC: 1.42 NG/DL (ref 0.9–1.7)
TRIGL SERPL-MCNC: 105 MG/DL
TSH SERPL DL<=0.005 MIU/L-ACNC: 7.22 UIU/ML (ref 0.3–4.2)

## 2023-08-28 RX ORDER — LEVOTHYROXINE SODIUM 112 UG/1
TABLET ORAL
Qty: 90 TABLET | Refills: 1 | Status: SHIPPED | OUTPATIENT
Start: 2023-08-28 | End: 2024-02-28

## 2023-08-28 NOTE — TELEPHONE ENCOUNTER
Patient read mychart message from Jeanette.  Will postpone to monitor that she reads result note.  Maday Harrison RN

## 2023-08-29 NOTE — TELEPHONE ENCOUNTER
Pt viewed [employer] message, encounter closed  Irlanda Hall RN, BSN  [facility]

## 2023-10-31 ENCOUNTER — DOCUMENTATION ONLY (OUTPATIENT)
Dept: FAMILY MEDICINE | Facility: CLINIC | Age: 63
End: 2023-10-31
Payer: COMMERCIAL

## 2023-10-31 DIAGNOSIS — I10 ESSENTIAL HYPERTENSION, BENIGN: ICD-10-CM

## 2023-10-31 DIAGNOSIS — E03.9 ACQUIRED HYPOTHYROIDISM: Primary | ICD-10-CM

## 2023-10-31 DIAGNOSIS — R80.9 MICROALBUMINURIA: ICD-10-CM

## 2023-11-24 ENCOUNTER — LAB (OUTPATIENT)
Dept: LAB | Facility: CLINIC | Age: 63
End: 2023-11-24
Payer: COMMERCIAL

## 2023-11-24 DIAGNOSIS — E03.9 ACQUIRED HYPOTHYROIDISM: ICD-10-CM

## 2023-11-24 DIAGNOSIS — R80.9 MICROALBUMINURIA: ICD-10-CM

## 2023-11-24 DIAGNOSIS — I10 ESSENTIAL HYPERTENSION, BENIGN: ICD-10-CM

## 2023-11-24 LAB
CHOLEST SERPL-MCNC: 192 MG/DL
CREAT UR-MCNC: 89.8 MG/DL
HDLC SERPL-MCNC: 55 MG/DL
LDLC SERPL CALC-MCNC: 122 MG/DL
MICROALBUMIN UR-MCNC: 15.6 MG/L
MICROALBUMIN/CREAT UR: 17.37 MG/G CR (ref 0–25)
NONHDLC SERPL-MCNC: 137 MG/DL
T4 FREE SERPL-MCNC: 1.55 NG/DL (ref 0.9–1.7)
TRIGL SERPL-MCNC: 75 MG/DL
TSH SERPL DL<=0.005 MIU/L-ACNC: 4.36 UIU/ML (ref 0.3–4.2)

## 2023-11-24 PROCEDURE — 80061 LIPID PANEL: CPT

## 2023-11-24 PROCEDURE — 84439 ASSAY OF FREE THYROXINE: CPT

## 2023-11-24 PROCEDURE — 84443 ASSAY THYROID STIM HORMONE: CPT

## 2023-11-24 PROCEDURE — 82570 ASSAY OF URINE CREATININE: CPT

## 2023-11-24 PROCEDURE — 36415 COLL VENOUS BLD VENIPUNCTURE: CPT

## 2023-11-24 PROCEDURE — 82043 UR ALBUMIN QUANTITATIVE: CPT

## 2023-11-28 ENCOUNTER — MYC MEDICAL ADVICE (OUTPATIENT)
Dept: FAMILY MEDICINE | Facility: CLINIC | Age: 63
End: 2023-11-28
Payer: COMMERCIAL

## 2023-11-29 NOTE — TELEPHONE ENCOUNTER
Jeanette,     Please see patient's  message about elevation of creatinine and advise as able.       Thanks,   Amber FARR RN on 11/29/2023 at 11:21 AM

## 2023-12-13 ENCOUNTER — TRANSFERRED RECORDS (OUTPATIENT)
Dept: HEALTH INFORMATION MANAGEMENT | Facility: CLINIC | Age: 63
End: 2023-12-13
Payer: COMMERCIAL

## 2024-01-09 ENCOUNTER — MYC MEDICAL ADVICE (OUTPATIENT)
Dept: FAMILY MEDICINE | Facility: CLINIC | Age: 64
End: 2024-01-09
Payer: COMMERCIAL

## 2024-01-16 ENCOUNTER — TRANSFERRED RECORDS (OUTPATIENT)
Dept: HEALTH INFORMATION MANAGEMENT | Facility: CLINIC | Age: 64
End: 2024-01-16
Payer: COMMERCIAL

## 2024-01-18 ENCOUNTER — TELEPHONE (OUTPATIENT)
Dept: FAMILY MEDICINE | Facility: CLINIC | Age: 64
End: 2024-01-18
Payer: COMMERCIAL

## 2024-01-18 NOTE — TELEPHONE ENCOUNTER
Patient Quality Outreach    Patient is due for the following:   Hypertension -  BP check    Next Steps:   Schedule a nurse only visit for Bp check    Type of outreach:    Phone, spoke to patient/parent. Made appt with nurse only 1/23/24      Questions for provider review:    None           Adelaide Meza, CMA

## 2024-01-23 ENCOUNTER — ALLIED HEALTH/NURSE VISIT (OUTPATIENT)
Dept: FAMILY MEDICINE | Facility: CLINIC | Age: 64
End: 2024-01-23
Payer: COMMERCIAL

## 2024-01-23 ENCOUNTER — TELEPHONE (OUTPATIENT)
Dept: FAMILY MEDICINE | Facility: CLINIC | Age: 64
End: 2024-01-23

## 2024-01-23 VITALS — DIASTOLIC BLOOD PRESSURE: 102 MMHG | HEART RATE: 80 BPM | SYSTOLIC BLOOD PRESSURE: 124 MMHG

## 2024-01-23 DIAGNOSIS — I10 ESSENTIAL HYPERTENSION, BENIGN: Primary | ICD-10-CM

## 2024-01-23 PROCEDURE — 99207 PR NO CHARGE NURSE ONLY: CPT

## 2024-01-23 NOTE — PROGRESS NOTES
Claudia Ellison is a 63 year old year old patient who comes in today for a Blood Pressure check because of ongoing blood pressure monitoring.  Vital Signs as repeated by /102  Patient is taking medication as prescribed  Patient is tolerating medications well.  Patient is monitoring Blood Pressure at home.  Average readings if yes are 132/82  Current complaints: none  Disposition:  huddled with provider    Pt is currently taking 100mg losartan daily. Pt is taking medication at same time daily and is monitoring BP at home.  Huddled with PCP Jeanette Lorenzo PA-C who would like to leave medications as is for now.    Advised pt to continue monitoring BP at home and to call clinic if elevated readings or any new or worsening symptoms. Patient was given an opportunity to ask questions, verbalized understanding of plan, and is agreeable.    Callie SINGH RN

## 2024-01-23 NOTE — PROGRESS NOTES
Claudia Ellison is a 63 year old patient who comes in today for a Blood Pressure check.  Initial BP:  BP (!) 119/100 (BP Location: Left arm, Patient Position: Sitting, Cuff Size: Adult Large)   Pulse 80      80  Disposition: BP elevated.  Triage RN notified, patient asked to wait

## 2024-01-23 NOTE — TELEPHONE ENCOUNTER
Jeanette Lorenzo PA-C, please see allied nurse visit with today's date.     Pt was here for BP check and it was 119/100 initially and then 124/102 upon recheck by RN. Pt is taking losartan 100mg daily. She takes medication at same time daily. Pt denies any symptoms or side effects. Home readings are running around 132/82 according to pt.    Routing to PCP to review and advise.  Callie SINGH RN

## 2024-01-24 ENCOUNTER — NURSE TRIAGE (OUTPATIENT)
Dept: FAMILY MEDICINE | Facility: CLINIC | Age: 64
End: 2024-01-24
Payer: COMMERCIAL

## 2024-01-24 NOTE — TELEPHONE ENCOUNTER
Overall improved from last visit and based on home readings appears to be at goal. Continue with losartan 100 mg daily and would like to have her come in for an office visit for blood pressure follow up in 1-2 months to repeat lab work and recheck blood pressure.     Thanks!  Jeanette Lorenzo PA-C

## 2024-01-24 NOTE — TELEPHONE ENCOUNTER
Pt calls back.      Advised of below.      Pt states her bp at home today was 126/86 this morning.  At lunch it was 172/101.  She is not sure why it is spiking at lunch.  She is shaky.  No chest pain.  No SOB.  No bad headaches or dizziness.  She feels like her left arm is asleep and not all the way awake down to her fingers.  It is kind of a numbness feeling from her elbow down.  No problems with her vision.  No problems swallowing.   This is not normal for her and she is concerned.      See nurse triage of 1/24/24.  Pt advised to call 911 per protocol.

## 2024-01-24 NOTE — TELEPHONE ENCOUNTER
"See telephone call of 1/23/24.      Pt is c/o numbness on her left arm from the elbow down to hand.  She says this is not normal for her.  Her bp was elevated at lunch time 172/101.  She feels a little shaky and her hands are cold.      Per protocol, pt to call 911.      Pts  on the phone also.  He said they will do that.        Reason for Disposition   New neurologic deficit that is present NOW, sudden onset of ANY of the following: * Weakness of the face, arm, or leg on one side of the body* Numbness of the face, arm, or leg on one side of the body* Loss of speech or garbled speech    Additional Information   Negative: Difficult to awaken or acting confused (e.g., disoriented, slurred speech)    Answer Assessment - Initial Assessment Questions  1. SYMPTOM: \"What is the main symptom you are concerned about?\" (e.g., weakness, numbness)      Numbness is from the left elbow down to your hand  2. ONSET: \"When did this start?\" (minutes, hours, days; while sleeping)      She started feeling shaky and checked her bp - around lunch time today  3. LAST NORMAL: \"When was the last time you (the patient) were normal (no symptoms)?\"      Before lunch today   4. PATTERN \"Does this come and go, or has it been constant since it started?\"  \"Is it present now?\"      Fingers feel cold, it is constant numbness   5. CARDIAC SYMPTOMS: \"Have you had any of the following symptoms: chest pain, difficulty breathing, palpitations?\"      no  6. NEUROLOGIC SYMPTOMS: \"Have you had any of the following symptoms: headache, dizziness, vision loss, double vision, changes in speech, unsteady on your feet?\"      no  7. OTHER SYMPTOMS: \"Do you have any other symptoms?\"      no  8. PREGNANCY: \"Is there any chance you are pregnant?\" \"When was your last menstrual period?\"      N/a    Protocols used: Neurologic Deficit-A-OH    "

## 2024-01-24 NOTE — TELEPHONE ENCOUNTER
Called cell- no answer and V/M not set-up.  L/M on home phone to call as soon as gets message.  Maday Harrison RN

## 2024-01-24 NOTE — TELEPHONE ENCOUNTER
Tried pt on cell, NA and cannot leave message, also tried home number, has CTC on file, LM to call to confirm went in for marnie Hall, RN, BSN  [facility]

## 2024-01-24 NOTE — TELEPHONE ENCOUNTER
With new symptoms, especially numbness and arm symptoms would recommend emergency visit.    Thanks!  Jeanette Lorenzo PA-C

## 2024-01-25 NOTE — TELEPHONE ENCOUNTER
RN called and spoke with patient.     She was seen in the Urgency room; they evaluated, recommended MRI (unable to see records) and a follow up with clinic.     Scheduled follow up 1/26/24 at 9am with 8:40 arrival time.       Amber FARR RN on 1/25/2024 at 9:08 AM

## 2024-01-26 ENCOUNTER — OFFICE VISIT (OUTPATIENT)
Dept: FAMILY MEDICINE | Facility: CLINIC | Age: 64
End: 2024-01-26
Payer: COMMERCIAL

## 2024-01-26 VITALS
DIASTOLIC BLOOD PRESSURE: 92 MMHG | RESPIRATION RATE: 14 BRPM | BODY MASS INDEX: 25.36 KG/M2 | HEART RATE: 92 BPM | WEIGHT: 157.8 LBS | SYSTOLIC BLOOD PRESSURE: 148 MMHG | HEIGHT: 66 IN | OXYGEN SATURATION: 97 % | TEMPERATURE: 98.9 F

## 2024-01-26 DIAGNOSIS — E03.9 ACQUIRED HYPOTHYROIDISM: ICD-10-CM

## 2024-01-26 DIAGNOSIS — I10 ESSENTIAL HYPERTENSION, BENIGN: Primary | ICD-10-CM

## 2024-01-26 LAB
T4 FREE SERPL-MCNC: 1.67 NG/DL (ref 0.9–1.7)
TSH SERPL DL<=0.005 MIU/L-ACNC: 3.42 UIU/ML (ref 0.3–4.2)

## 2024-01-26 PROCEDURE — 84439 ASSAY OF FREE THYROXINE: CPT | Performed by: PHYSICIAN ASSISTANT

## 2024-01-26 PROCEDURE — 36415 COLL VENOUS BLD VENIPUNCTURE: CPT | Performed by: PHYSICIAN ASSISTANT

## 2024-01-26 PROCEDURE — 84443 ASSAY THYROID STIM HORMONE: CPT | Performed by: PHYSICIAN ASSISTANT

## 2024-01-26 PROCEDURE — 99214 OFFICE O/P EST MOD 30 MIN: CPT | Performed by: PHYSICIAN ASSISTANT

## 2024-01-26 RX ORDER — SPIRONOLACTONE 25 MG/1
25 TABLET ORAL DAILY
Qty: 30 TABLET | Refills: 1 | Status: SHIPPED | OUTPATIENT
Start: 2024-01-26 | End: 2024-02-16

## 2024-01-26 RX ORDER — ZINC GLUCONATE 50 MG
50 TABLET ORAL DAILY
COMMUNITY

## 2024-01-26 NOTE — PROGRESS NOTES
"  Assessment & Plan     Essential hypertension, benign  Imaging, notes and labs from [facility]ency Room were reviewed. Neurologically she remains intact with normal testing in the urgency room (slightly low sodium). We will try spironolactone as a potassium sparing diuretic in addition to the losartan with close monitoring of blood pressure at home and recheck BMP in 2 weeks. If still running high can consider further adjustments.   - Basic metabolic panel  (Ca, Cl, CO2, Creat, Gluc, K, Na, BUN); Future  - spironolactone (ALDACTONE) 25 MG tablet; Take 1 tablet (25 mg) by mouth daily  - Basic metabolic panel; Future    Acquired hypothyroidism  Recheck thyroid testing today. She reports elevations in her blood pressure has been noted since the adjustment in her levothyroxine (increasing the dose). She also has noticed an increased appetite with this change.  - TSH; Future  - T4, free; Future  - TSH  - T4, free    Review of prior external note(s) from - CareEverywhere information from [facility] reviewed  Review of external notes as documented elsewhere in note  Review of the result(s) of each unique test - imaging and labs from [facility]ency Room  Ordering of each unique test  Prescription drug management      MED REC REQUIRED  Post Medication Reconciliation Status:  Discharge medications reconciled and changed, see notes/orders      Subjective   Claudia is a 63 year old, presenting for the following health issues:  ER F/U (1/24/2024 The [facility] - Johnie Ellison \"Claudia\" - Female; born [birth year]-Encounter Summary, generated on Jan. 26, 2024-January 26, 2024  Reason for Visit Reason Comments--Hypertension//Alo Lou,  - 01/24/2024 5:35 PM CST /Formatting of this note might be different from the original./What do you do next:/Continue taking all home medications unless we specifically changed them./You should talk to your primary care clinic about rechecking your blood pressure, )        1/26/2024 " "    8:47 AM   Additional Questions   Roomed by tomy gypsy ALLEN     Patient is a 63-year-old female who presents today for follow-up from emergency room visit at on 1/24/2024.  Patient presented to emergency room with concern for elevated blood pressure as well as symptoms of feeling \"cold and shaky\" in her left arms over the last 2 days.  She denied any vision changes, speech changes, weakness, numbness, headache, chest pain or syncope.  Patient had CT angiogram of the head and neck which was negative.  EKG also obtained and showed normal sinus rhythm. Testing including troponin, CBC and BMP were grossly normal.    Medication Followup of levothyroxine  Taking Medication as prescribed: yes  Side Effects:  hyper and hungry every couple hours  Medication Helping Symptoms:  yes    Medication Followup of Losartan  Taking Medication as prescribed: yes  Side Effects:  BP up and down -1-26-24 138/87 5:30am; 127/77 6am, 153/100 8:25    Patient does have records of BP readings    Medication Helping Symptoms:  yes        Objective    BP (!) 148/92   Pulse 92   Temp 98.9  F (37.2  C) (Oral)   Resp 14   Ht 1.664 m (5' 5.5\")   Wt 71.6 kg (157 lb 12.8 oz)   SpO2 97%   BMI 25.86 kg/m    Body mass index is 25.86 kg/m .  Physical Exam   GENERAL: No acute distress  HEENT: Normocephalic  NEURO: Alert and non-focal          Signed Electronically by: Sharita Daily PA-C    "

## 2024-01-26 NOTE — COMMUNITY RESOURCES LIST (ENGLISH)
01/26/2024    [email or web address]  N/A  For questions about this resource list or additional care needs, please contact your primary care clinic or care manager.  Phone: [phone]   Email: N/A   Address: [address] [place] [id number]   Hours: N/A        Financial Stability       Utility payment assistance  1  [address] [place] [address] Distance: 2.89 miles      In-Person, Phone/Virtual   [address] [place] [address]  Language: English  Hours: Mon 8:00 AM - 4:00 PM , Tue 8:00 AM - 7:00 PM , Wed - Thu 8:00 AM - 4:00 PM  Fees: Free   Phone: (968) 749-9311 Email: info@[employer] Website: https://[employer]/resources/resource-centers/     2  [employer] ([facility]) [facility] - Energy Assistance Program (EAP) Distance: 3.95 miles      In-Person   [address] [place] [address]  Language: English, Bermudian  Hours: Mon - Fri 8:00 AM - 4:30 PM  Fees: Free   Phone: (228) 839-3802 Email: info@[employer] Website: http://www.[employer]Patient.org          Important Numbers & Websites       Emergency Services   911  City Services   311  Poison Control   (376) 731-2602  Suicide Prevention Lifeline   (889) 500-7879 (TALK)  Child Abuse Hotline   (187) 428-8426 (4-A-Child)  Sexual Assault Hotline   (722) 209-2723 (HOPE)  National Runaway Safeline   (324) 890-1548 (RUNAWAY)  All-Options Talkline   (848) 966-2478  Substance Abuse Referral   (829) 299-4507 (HELP)

## 2024-01-29 ENCOUNTER — TELEPHONE (OUTPATIENT)
Dept: FAMILY MEDICINE | Facility: CLINIC | Age: 64
End: 2024-01-29
Payer: COMMERCIAL

## 2024-01-29 NOTE — TELEPHONE ENCOUNTER
----- Message from Sharita Daily PA-C sent at 1/29/2024  7:20 AM CST -----  Please call patient thyroid testing is normal. I know she has felt like the dosing of levothyroxine may be a little high. If she would like to lower the dose slightly we can try that and continue to monitor. I would recommend keeping the dose the same for right (that way we are changing only one thing at a time).

## 2024-01-29 NOTE — TELEPHONE ENCOUNTER
RN attempted to reach patient to relay message. LVMTCB on Home phone number.       RN attempted to reach patient on Mobile number, VM not set up so unable to leave         Amber FARR RN on 1/29/2024 at 10:17 AM

## 2024-01-30 NOTE — TELEPHONE ENCOUNTER
Pt calls, informed, prefers to monitor for now and will think about it, has about one month left on current dose, pt will send message later if desires dose decrease    Irlanda Hall RN, BSN  [facility]

## 2024-02-08 ENCOUNTER — LAB (OUTPATIENT)
Dept: LAB | Facility: CLINIC | Age: 64
End: 2024-02-08
Payer: COMMERCIAL

## 2024-02-08 DIAGNOSIS — I10 ESSENTIAL HYPERTENSION, BENIGN: ICD-10-CM

## 2024-02-08 LAB
ANION GAP SERPL CALCULATED.3IONS-SCNC: 7 MMOL/L (ref 7–15)
BUN SERPL-MCNC: 16.4 MG/DL (ref 8–23)
CALCIUM SERPL-MCNC: 9.8 MG/DL (ref 8.8–10.2)
CHLORIDE SERPL-SCNC: 95 MMOL/L (ref 98–107)
CREAT SERPL-MCNC: 0.8 MG/DL (ref 0.51–0.95)
DEPRECATED HCO3 PLAS-SCNC: 27 MMOL/L (ref 22–29)
EGFRCR SERPLBLD CKD-EPI 2021: 82 ML/MIN/1.73M2
GLUCOSE SERPL-MCNC: 95 MG/DL (ref 70–99)
POTASSIUM SERPL-SCNC: 5 MMOL/L (ref 3.4–5.3)
SODIUM SERPL-SCNC: 129 MMOL/L (ref 135–145)

## 2024-02-08 PROCEDURE — 80048 BASIC METABOLIC PNL TOTAL CA: CPT

## 2024-02-08 PROCEDURE — 36415 COLL VENOUS BLD VENIPUNCTURE: CPT

## 2024-02-09 ENCOUNTER — TELEPHONE (OUTPATIENT)
Dept: FAMILY MEDICINE | Facility: CLINIC | Age: 64
End: 2024-02-09

## 2024-02-09 DIAGNOSIS — I10 ESSENTIAL HYPERTENSION, BENIGN: Primary | ICD-10-CM

## 2024-02-09 DIAGNOSIS — E87.1 HYPONATREMIA: ICD-10-CM

## 2024-02-09 NOTE — TELEPHONE ENCOUNTER
Called patient and advised of below.  Patient agrees with plan.  Patient requested Thursday for lab.  Scheduled at 10:15 am.  Maday Harrison RN

## 2024-02-09 NOTE — TELEPHONE ENCOUNTER
Patient only took half tablet of spironolactone yesterday.    BP has been low.   2/8/24 99/68 ; recheck 20 minutes later 112/76. 12:30 (after lunch) 141/91    2/9/24 0600 109/68    Having Leg cramps, hot flashes, head pressure, light headed on occasion.    When should patient recheck lab?    Patient wants to increase dietary sodium on her own.    Esperanza Brice RN

## 2024-02-09 NOTE — TELEPHONE ENCOUNTER
I would recommend she stops the spironolactone since it is lowering her blood pressure too much. And recheck lab next week (early to mid next week).

## 2024-02-09 NOTE — TELEPHONE ENCOUNTER
Sharita Daily, FARNAZ  P Cr Triage  Please call patient and let her know that sodium levels are still on the low side (slightly lower than previous). How is she feeling? How are home blood pressures?

## 2024-02-15 ENCOUNTER — LAB (OUTPATIENT)
Dept: LAB | Facility: CLINIC | Age: 64
End: 2024-02-15
Payer: COMMERCIAL

## 2024-02-15 DIAGNOSIS — I10 ESSENTIAL HYPERTENSION, BENIGN: ICD-10-CM

## 2024-02-15 DIAGNOSIS — E87.1 HYPONATREMIA: ICD-10-CM

## 2024-02-15 LAB
ANION GAP SERPL CALCULATED.3IONS-SCNC: 9 MMOL/L (ref 7–15)
BUN SERPL-MCNC: 17 MG/DL (ref 8–23)
CALCIUM SERPL-MCNC: 9.4 MG/DL (ref 8.8–10.2)
CHLORIDE SERPL-SCNC: 97 MMOL/L (ref 98–107)
CREAT SERPL-MCNC: 0.75 MG/DL (ref 0.51–0.95)
DEPRECATED HCO3 PLAS-SCNC: 27 MMOL/L (ref 22–29)
EGFRCR SERPLBLD CKD-EPI 2021: 89 ML/MIN/1.73M2
GLUCOSE SERPL-MCNC: 89 MG/DL (ref 70–99)
POTASSIUM SERPL-SCNC: 4.4 MMOL/L (ref 3.4–5.3)
SODIUM SERPL-SCNC: 133 MMOL/L (ref 135–145)

## 2024-02-15 PROCEDURE — 36415 COLL VENOUS BLD VENIPUNCTURE: CPT

## 2024-02-15 PROCEDURE — 80048 BASIC METABOLIC PNL TOTAL CA: CPT

## 2024-02-16 ENCOUNTER — TELEPHONE (OUTPATIENT)
Dept: FAMILY MEDICINE | Facility: CLINIC | Age: 64
End: 2024-02-16
Payer: COMMERCIAL

## 2024-02-16 NOTE — TELEPHONE ENCOUNTER
----- Message from Sharita Daily PA-C sent at 2/16/2024  8:10 AM CST -----  Please call patient and let her know that sodium levels are normalizing. How is her blood pressure without the spironolactone?

## 2024-02-19 NOTE — TELEPHONE ENCOUNTER
BP's have been:  AM's: 112/74, 124/78, 132/82  PM's: 128/86, 146/86, 120/79    Esperanza Brice RN

## 2024-02-20 NOTE — TELEPHONE ENCOUNTER
RN attempted to reach patient. LVMTCB and speak with nurse.     Amber FARR RN on 2/20/2024 at 8:50 AM

## 2024-02-21 NOTE — TELEPHONE ENCOUNTER
Contacted patient and relayed provider's message. Patient verbalized understanding and denies further questions at this time.    Lilian WANG RN, BSN, PHN

## 2024-02-28 ENCOUNTER — MYC REFILL (OUTPATIENT)
Dept: FAMILY MEDICINE | Facility: CLINIC | Age: 64
End: 2024-02-28
Payer: COMMERCIAL

## 2024-02-28 ENCOUNTER — PATIENT OUTREACH (OUTPATIENT)
Dept: GASTROENTEROLOGY | Facility: CLINIC | Age: 64
End: 2024-02-28
Payer: COMMERCIAL

## 2024-02-28 DIAGNOSIS — E03.9 ACQUIRED HYPOTHYROIDISM: ICD-10-CM

## 2024-02-28 RX ORDER — LEVOTHYROXINE SODIUM 112 UG/1
TABLET ORAL
Qty: 90 TABLET | Refills: 3 | Status: SHIPPED | OUTPATIENT
Start: 2024-02-28

## 2024-05-03 ENCOUNTER — TELEPHONE (OUTPATIENT)
Dept: FAMILY MEDICINE | Facility: CLINIC | Age: 64
End: 2024-05-03
Payer: COMMERCIAL

## 2024-05-03 NOTE — TELEPHONE ENCOUNTER
Patient Quality Outreach    Patient is due for the following:   Hypertension -  BP check    Next Steps:   Schedule a nurse only visit for BP    Type of outreach:    Sent [employer] message.      Questions for provider review:    None           Elida Parks, CMA

## 2024-07-22 ENCOUNTER — TELEPHONE (OUTPATIENT)
Dept: FAMILY MEDICINE | Facility: CLINIC | Age: 64
End: 2024-07-22
Payer: COMMERCIAL

## 2024-07-22 NOTE — LETTER
[facility]  [address] [facility] [phone]  July 22, 2024    Claudia Ellison  [address] [facility] [address]    Dear Claudia,    I care about your health and have reviewed your health plan. I have reviewed your medical conditions, medication list, and lab results and am making recommendations based on this review, to better manage your health.    You are in particular need of attention regarding:  -High Blood Pressure  -Cervical Cancer Screening    I am recommending that you:  {recommendations:-schedule a NURSE-ONLY BLOOD PRESSURE APPOINTMENT within the next 1-4 weeks.  -schedule a PAP SMEAR EXAM which is due.  Please disregard this reminder if you have had this exam elsewhere within the last year.  It would be helpful for us to have a copy of your recent pap smear report in our file so that we can best coordinate your care.    Here is a list of Health Maintenance topics that are due now or due soon:  Health Maintenance Due   Topic Date Due    ZOSTER IMMUNIZATION (1 of 2) Never done    RSV VACCINE (Pregnancy & 60+) (1 - 1-dose 60+ series) Never done    DTAP/TDAP/TD IMMUNIZATION (2 - Td or Tdap) 08/18/2021    ANNUAL REVIEW OF HM ORDERS  04/11/2023    COVID-19 Vaccine (1 - 2023-24 season) Never done    PAP  04/15/2024       Please call us at [phone] (or use [employer]) to address the above recommendations.           Thank you for trusting [facility] and we appreciate the opportunity to serve you.  We look forward to supporting your healthcare needs in the future.    Healthy Regards,    Jeanette Lorenzo PA-C

## 2024-07-22 NOTE — TELEPHONE ENCOUNTER
Patient Quality Outreach    Patient is due for the following:   Hypertension -  BP check    Next Steps:   Schedule a nurse only visit for B/P check    Type of outreach:    Sent [employer] message. and Sent letter.      Questions for provider review:    None           Marianna Hamilton, CMA

## 2024-07-25 ENCOUNTER — PATIENT OUTREACH (OUTPATIENT)
Dept: CARE COORDINATION | Facility: CLINIC | Age: 64
End: 2024-07-25
Payer: COMMERCIAL

## 2024-08-08 ENCOUNTER — PATIENT OUTREACH (OUTPATIENT)
Dept: CARE COORDINATION | Facility: CLINIC | Age: 64
End: 2024-08-08
Payer: COMMERCIAL

## 2024-10-02 ENCOUNTER — MYC REFILL (OUTPATIENT)
Dept: FAMILY MEDICINE | Facility: CLINIC | Age: 64
End: 2024-10-02
Payer: COMMERCIAL

## 2024-10-02 DIAGNOSIS — I10 ESSENTIAL HYPERTENSION, BENIGN: ICD-10-CM

## 2024-10-02 RX ORDER — LOSARTAN POTASSIUM 100 MG/1
100 TABLET ORAL DAILY
Qty: 30 TABLET | Refills: 0 | Status: SHIPPED | OUTPATIENT
Start: 2024-10-02 | End: 2024-10-23

## 2024-10-12 ENCOUNTER — HEALTH MAINTENANCE LETTER (OUTPATIENT)
Age: 64
End: 2024-10-12

## 2024-10-22 ENCOUNTER — TELEPHONE (OUTPATIENT)
Dept: FAMILY MEDICINE | Facility: CLINIC | Age: 64
End: 2024-10-22
Payer: COMMERCIAL

## 2024-10-22 NOTE — TELEPHONE ENCOUNTER
Patient Quality Outreach    Patient is due for the following:   Cervical Cancer Screening - PAP Needed  Physical Preventive Adult Physical      Topic Date Due    Zoster (Shingles) Vaccine (1 of 2) Never done    Diptheria Tetanus Pertussis (DTAP/TDAP/TD) Vaccine (2 - Td or Tdap) 08/18/2021    Flu Vaccine (1) 09/01/2024    COVID-19 Vaccine (1 - 2024-25 season) Never done       Next Steps:   Patient has upcoming appointment, these items will be addressed at that time. Patient is seeing Jeanette Lorenzo on 10/23/24    Type of outreach:    Chart review performed, no outreach needed.      Questions for provider review:    None           Alia Quintanilla MA

## 2024-10-23 ENCOUNTER — OFFICE VISIT (OUTPATIENT)
Dept: FAMILY MEDICINE | Facility: CLINIC | Age: 64
End: 2024-10-23
Attending: PHYSICIAN ASSISTANT
Payer: COMMERCIAL

## 2024-10-23 VITALS
TEMPERATURE: 97.9 F | HEART RATE: 93 BPM | OXYGEN SATURATION: 97 % | SYSTOLIC BLOOD PRESSURE: 143 MMHG | HEIGHT: 66 IN | RESPIRATION RATE: 15 BRPM | WEIGHT: 159.2 LBS | BODY MASS INDEX: 25.58 KG/M2 | DIASTOLIC BLOOD PRESSURE: 92 MMHG

## 2024-10-23 DIAGNOSIS — E03.9 ACQUIRED HYPOTHYROIDISM: ICD-10-CM

## 2024-10-23 DIAGNOSIS — Z00.00 ROUTINE GENERAL MEDICAL EXAMINATION AT A HEALTH CARE FACILITY: Primary | ICD-10-CM

## 2024-10-23 DIAGNOSIS — R23.9 SKIN CHANGE: ICD-10-CM

## 2024-10-23 DIAGNOSIS — R80.9 MICROALBUMINURIA: ICD-10-CM

## 2024-10-23 DIAGNOSIS — Z12.4 CERVICAL CANCER SCREENING: ICD-10-CM

## 2024-10-23 DIAGNOSIS — N89.8 VAGINAL ITCHING: ICD-10-CM

## 2024-10-23 DIAGNOSIS — I10 ESSENTIAL HYPERTENSION, BENIGN: ICD-10-CM

## 2024-10-23 DIAGNOSIS — N95.2 VAGINAL ATROPHY: ICD-10-CM

## 2024-10-23 DIAGNOSIS — R09.81 SINUS CONGESTION: ICD-10-CM

## 2024-10-23 LAB
ANION GAP SERPL CALCULATED.3IONS-SCNC: 11 MMOL/L (ref 7–15)
BUN SERPL-MCNC: 12.5 MG/DL (ref 8–23)
CALCIUM SERPL-MCNC: 9.6 MG/DL (ref 8.8–10.4)
CHLORIDE SERPL-SCNC: 101 MMOL/L (ref 98–107)
CHOLEST SERPL-MCNC: 190 MG/DL
CREAT SERPL-MCNC: 0.78 MG/DL (ref 0.51–0.95)
CREAT UR-MCNC: 138 MG/DL
EGFRCR SERPLBLD CKD-EPI 2021: 84 ML/MIN/1.73M2
FASTING STATUS PATIENT QL REPORTED: YES
FASTING STATUS PATIENT QL REPORTED: YES
GLUCOSE SERPL-MCNC: 91 MG/DL (ref 70–99)
HCO3 SERPL-SCNC: 26 MMOL/L (ref 22–29)
HDLC SERPL-MCNC: 50 MG/DL
LDLC SERPL CALC-MCNC: 117 MG/DL
MICROALBUMIN UR-MCNC: 29.7 MG/L
MICROALBUMIN/CREAT UR: 21.52 MG/G CR (ref 0–25)
NONHDLC SERPL-MCNC: 140 MG/DL
POTASSIUM SERPL-SCNC: 4.2 MMOL/L (ref 3.4–5.3)
SODIUM SERPL-SCNC: 138 MMOL/L (ref 135–145)
TRIGL SERPL-MCNC: 115 MG/DL
TSH SERPL DL<=0.005 MIU/L-ACNC: 0.86 UIU/ML (ref 0.3–4.2)

## 2024-10-23 PROCEDURE — 82570 ASSAY OF URINE CREATININE: CPT

## 2024-10-23 PROCEDURE — 36415 COLL VENOUS BLD VENIPUNCTURE: CPT

## 2024-10-23 PROCEDURE — G0145 SCR C/V CYTO,THINLAYER,RESCR: HCPCS

## 2024-10-23 PROCEDURE — 99396 PREV VISIT EST AGE 40-64: CPT

## 2024-10-23 PROCEDURE — 80061 LIPID PANEL: CPT

## 2024-10-23 PROCEDURE — 80048 BASIC METABOLIC PNL TOTAL CA: CPT

## 2024-10-23 PROCEDURE — 84443 ASSAY THYROID STIM HORMONE: CPT

## 2024-10-23 PROCEDURE — 99459 PELVIC EXAMINATION: CPT

## 2024-10-23 PROCEDURE — 82043 UR ALBUMIN QUANTITATIVE: CPT

## 2024-10-23 PROCEDURE — 99214 OFFICE O/P EST MOD 30 MIN: CPT | Mod: 25

## 2024-10-23 PROCEDURE — 87624 HPV HI-RISK TYP POOLED RSLT: CPT

## 2024-10-23 RX ORDER — LEVOTHYROXINE SODIUM 112 UG/1
TABLET ORAL
Qty: 90 TABLET | Refills: 3 | Status: SHIPPED | OUTPATIENT
Start: 2024-10-23

## 2024-10-23 RX ORDER — HYDROCORTISONE 25 MG/G
OINTMENT TOPICAL 2 TIMES DAILY
Qty: 30 G | Refills: 1 | Status: SHIPPED | OUTPATIENT
Start: 2024-10-23

## 2024-10-23 RX ORDER — ESTRADIOL 10 UG/1
10 INSERT VAGINAL
Qty: 24 TABLET | Refills: 3 | Status: SHIPPED | OUTPATIENT
Start: 2024-10-24

## 2024-10-23 RX ORDER — LOSARTAN POTASSIUM 100 MG/1
100 TABLET ORAL DAILY
Qty: 90 TABLET | Refills: 3 | Status: SHIPPED | OUTPATIENT
Start: 2024-10-23

## 2024-10-23 SDOH — HEALTH STABILITY: PHYSICAL HEALTH: ON AVERAGE, HOW MANY DAYS PER WEEK DO YOU ENGAGE IN MODERATE TO STRENUOUS EXERCISE (LIKE A BRISK WALK)?: 5 DAYS

## 2024-10-23 ASSESSMENT — SOCIAL DETERMINANTS OF HEALTH (SDOH): HOW OFTEN DO YOU GET TOGETHER WITH FRIENDS OR RELATIVES?: ONCE A WEEK

## 2024-10-23 NOTE — PATIENT INSTRUCTIONS
Patient Education   Preventive Care Advice   This is general advice given by our system to help you stay healthy. However, your care team may have specific advice just for you. Please talk to your care team about your preventive care needs.  Nutrition  Eat 5 or more servings of fruits and vegetables each day.  Try wheat bread, brown rice and whole grain pasta (instead of white bread, rice, and pasta).  Get enough calcium and vitamin D. Check the label on foods and aim for 100% of the RDA (recommended daily allowance).  Lifestyle  Exercise at least 150 minutes each week  (30 minutes a day, 5 days a week).  Do muscle strengthening activities 2 days a week. These help control your weight and prevent disease.  No smoking.  Wear sunscreen to prevent skin cancer.  Have a dental exam and cleaning every 6 months.  Yearly exams  See your health care team every year to talk about:  Any changes in your health.  Any medicines your care team has prescribed.  Preventive care, family planning, and ways to prevent chronic diseases.  Shots (vaccines)   HPV shots (up to age 26), if you've never had them before.  Hepatitis B shots (up to age 59), if you've never had them before.  COVID-19 shot: Get this shot when it's due.  Flu shot: Get a flu shot every year.  Tetanus shot: Get a tetanus shot every 10 years.  Pneumococcal, hepatitis A, and RSV shots: Ask your care team if you need these based on your risk.  Shingles shot (for age 50 and up)  General health tests  Diabetes screening:  Starting at age 35, Get screened for diabetes at least every 3 years.  If you are younger than age 35, ask your care team if you should be screened for diabetes.  Cholesterol test: At age 39, start having a cholesterol test every 5 years, or more often if advised.  Bone density scan (DEXA): At age 50, ask your care team if you should have this scan for osteoporosis (brittle bones).  Hepatitis C: Get tested at least once in your life.  STIs (sexually  transmitted infections)  Before age 24: Ask your care team if you should be screened for STIs.  After age 24: Get screened for STIs if you're at risk. You are at risk for STIs (including HIV) if:  You are sexually active with more than one person.  You don't use condoms every time.  You or a partner was diagnosed with a sexually transmitted infection.  If you are at risk for HIV, ask about PrEP medicine to prevent HIV.  Get tested for HIV at least once in your life, whether you are at risk for HIV or not.  Cancer screening tests  Cervical cancer screening: If you have a cervix, begin getting regular cervical cancer screening tests starting at age 21.  Breast cancer scan (mammogram): If you've ever had breasts, begin having regular mammograms starting at age 40. This is a scan to check for breast cancer.  Colon cancer screening: It is important to start screening for colon cancer at age 45.  Have a colonoscopy test every 10 years (or more often if you're at risk) Or, ask your provider about stool tests like a FIT test every year or Cologuard test every 3 years.  To learn more about your testing options, visit:   .  For help making a decision, visit:   https://bit.ly/he58648.  Prostate cancer screening test: If you have a prostate, ask your care team if a prostate cancer screening test (PSA) at age 55 is right for you.  Lung cancer screening: If you are a current or former smoker ages 50 to 80, ask your care team if ongoing lung cancer screenings are right for you.  For informational purposes only. Not to replace the advice of your health care provider. Copyright   2023 [employer]. All rights reserved. Clinically reviewed by the [facility] Transitions Program. A [employer] 447032 - REV 01/24.

## 2024-10-23 NOTE — PROGRESS NOTES
Preventive Care Visit  [facility]  Jeanette Lorenzo PA-C, Family Medicine  Oct 23, 2024    Assessment & Plan     (Z00.00) Routine general medical examination at a health care facility  (primary encounter diagnosis)  Stable exam. Routine screening labs, she is fasting today. Follow up in 1 year for annual wellness; sooner as needed for acute concerns.  Plan: Lipid panel reflex to direct LDL Fasting, Basic        metabolic panel  (Ca, Cl, CO2, Creat, Gluc, K,         Na, BUN), TSH with free T4 reflex          (Z12.4) Cervical cancer screening  Plan: HPV and Gynecologic Cytology Panel -         Recommended Age 30 - 65 Years          (R80.9) Microalbuminuria  Stable. Continue to monitor urine albumin. Pending at this time.   Plan: Albumin Random Urine Quantitative with Creat         Ratio          (I10) Essential hypertension, benign  Well controlled with use of losartan. Check BMP today. No new side effects of the medication. Refill provided.  Plan: Basic metabolic panel  (Ca, Cl, CO2, Creat,         Gluc, K, Na, BUN), losartan (COZAAR) 100 MG         tablet          (E03.9) Acquired hypothyroidism  Well controlled with use of levothyroxine. Check TSH level today. No new side effects of the medication. Refill provided.  Plan: TSH with free T4 reflex, levothyroxine         (SYNTHROID/LEVOTHROID) 112 MCG tablet          (R09.81) Sinus congestion  Seems likely allergen related. Currently using Mucinex in the afternoon/evening on days she has more congestion which works well. Okay to continue this for now but if worsening or not improving would recommend daily allergy medication trial. Could consider ENT referral in the future as well but do not feel necessary at this time.     (N89.8) Vaginal itching  Well controlled with use of hydrocortisone cream. No new side effects of the medication. Refill provided.  Plan: hydrocortisone 2.5 % ointment          (R23.9) Skin change  See above.   Plan:  "hydrocortisone 2.5 % ointment          (N95.2) Vaginal atrophy  Well controlled with use of estradiol tablets twice weekly. No new side effects of the medication. Refill provided.  Plan: estradiol (VAGIFEM) 10 MCG TABS vaginal tablet          Patient has been advised of split billing requirements and indicates understanding: Yes    BMI  Estimated body mass index is 26.09 kg/m  as calculated from the following:    Height as of this encounter: 1.664 m (5' 5.5\").    Weight as of this encounter: 72.2 kg (159 lb 3.2 oz).   Weight management plan: Discussed healthy diet and exercise guidelines    Counseling  Appropriate preventive services were addressed with this patient via screening, questionnaire, or discussion as appropriate for fall prevention, nutrition, physical activity, Tobacco-use cessation, social engagement, weight loss and cognition.  Checklist reviewing preventive services available has been given to the patient.  Reviewed patient's diet, addressing concerns and/or questions.   She is at risk for psychosocial distress and has been provided with information to reduce risk.       Follow up in 1 year for physical; sooner with any acute concerns.    Tatyana Taylor is a 64 year old, presenting for the following:  Physical        10/23/2024     7:43 AM   Additional Questions   Roomed by Kristina Harvey     HPI    -Questions about bloating in the lower abdomen.    -Trouble with sleep. Will fall asleep well at night but then will wake up in the middle of the night and have trouble falling back asleep.    -Has been having sinus congestion in the afternoon. Wondering if allergy related as seems to be worse if spending time outside.    Hypertension: Taking losartan but did not take this morning as she wanted to be fasting for her appointment. Does check pressures at home and typically readings will be high 120's for systolic and 80's diastolic. No issues with lightheadedness, dizziness, headaches, visionary " changes or other symptoms. No new side effects of the medication.      Hypothyroidism: Taking levothyroxine daily. No side effects of the medication. No symptoms of hyper/hypothyroidism.       Health Care Directive  Patient does not have a Health Care Directive: Discussed advance care planning with patient; however, patient declined at this time.        10/23/2024   General Health   How would you rate your overall physical health? Good   Feel stress (tense, anxious, or unable to sleep) Only a little      (!) STRESS CONCERN      10/23/2024   Nutrition   Three or more servings of calcium each day? Yes   Diet: Regular (no restrictions)   How many servings of fruit and vegetables per day? (!) 2-3   How many sweetened beverages each day? 0-1          10/23/2024   Exercise   Days per week of moderate/strenous exercise 5 days          10/23/2024   Social Factors   Frequency of gathering with friends or relatives Once a week   Worry food won't last until get money to buy more No   Food not last or not have enough money for food? No   Do you have housing? (Housing is defined as stable permanent housing and does not include staying ouside in a car, in a tent, in an abandoned building, in an overnight shelter, or couch-surfing.) Yes   Are you worried about losing your housing? No   Lack of transportation? No   Unable to get utilities (heat,electricity)? No          10/23/2024   Fall Risk   Fallen 2 or more times in the past year? Yes    Trouble with walking or balance? No    Gait Speed Test Interpretation Less than or equal to 5.00 seconds - PASS       Patient-reported         10/23/2024   Dental   Dentist two times every year? Yes          10/23/2024   TB Screening   Were you born outside of the [place]? No      Today's PHQ-2 Score:       1/26/2024     5:57 AM   PHQ-2 ( 1999 [employer])   Q1: Little interest or pleasure in doing things 0    Q2: Feeling down, depressed or hopeless 0    PHQ-2 Score 0   Q1: Little interest or pleasure in  doing things Not at all   Q2: Feeling down, depressed or hopeless Not at all   PHQ-2 Score 0       Patient-reported         10/23/2024   Substance Use   Alcohol more than 3/day or more than 7/wk No   Do you use any other substances recreationally? No      Social History     Tobacco Use    Smoking status: Never    Smokeless tobacco: Never   Vaping Use    Vaping status: Never Used   Substance Use Topics    Alcohol use: Yes     Comment: Once a week or less    Drug use: No     Mammogram Screening - Mammogram every 1-2 years updated in Health Maintenance based on mutual decision making        10/23/2024   STI Screening   New sexual partner(s) since last STI/HIV test? No      History of abnormal Pap smear: No - age 30- 64 PAP with HPV every 5 years recommended      10/10/2013    12:00 AM 9/19/2012    12:00 AM   PAP / HPV   PAP-ABSTRACT See Scanned Document     See Scanned Document           This result is from an external source.     ASCVD Risk   The 10-year ASCVD risk score (Stan GACRIA, et al., 2019) is: 8.1%    Values used to calculate the score:      Age: 64 years      Sex: Female      Is Non- : No      Diabetic: No      Tobacco smoker: No      Systolic Blood Pressure: 143 mmHg      Is BP treated: Yes      HDL Cholesterol: 55 mg/dL      Total Cholesterol: 192 mg/dL    Reviewed and updated as needed this visit by Provider   Tobacco  Allergies  Meds  Problems  Med Hx  Surg Hx  Fam Hx            Past Medical History:   Diagnosis Date    CARDIOVASCULAR SCREENING; LDL GOAL LESS THAN 160 10/31/2010    Essential hypertension, benign 12/16/2015    History of blood transfusion At birth    HYPOTHYROIDISM NOS 06/04/2007    Lateral epicondylitis 04/26/2007    Maternal varicella, non-immune 04/13/2022    NOT immune - needs vaccine    Microalbuminuria 10/10/2014    Vega's neuroma     Plantar fasciitis     Tubular adenoma of colon      Past Surgical History:   Procedure Laterality Date     BIOPSY      Mother    CARDIAC SURGERY      Mother    CHOLECYSTECTOMY      COLONOSCOPY N/A 10/22/2019    lela Pruitt in 5 years    SOFT TISSUE SURGERY      Myself     BP Readings from Last 3 Encounters:   10/23/24 (!) 143/92   24 (!) 148/92   24 (!) 124/102    Wt Readings from Last 3 Encounters:   10/23/24 72.2 kg (159 lb 3.2 oz)   24 71.6 kg (157 lb 12.8 oz)   23 72.1 kg (159 lb)        Patient Active Problem List   Diagnosis    Lateral epicondylitis    Female climacteric    CARDIOVASCULAR SCREENING; LDL GOAL LESS THAN 160    Tubular adenoma of colon    Microalbuminuria    Essential hypertension, benign    Acquired hypothyroidism     Past Surgical History:   Procedure Laterality Date    BIOPSY      Mother    CARDIAC SURGERY      Mother    CHOLECYSTECTOMY      COLONOSCOPY N/A 10/22/2019    lela Pruitt in 5 years    SOFT TISSUE SURGERY      Myself       Social History     Tobacco Use    Smoking status: Never    Smokeless tobacco: Never   Substance Use Topics    Alcohol use: Yes     Comment: Once a week or less     Family History   Problem Relation Age of Onset    Respiratory Mother         Asthma    Eye Disorder Mother         macular degeneration    Lipids Mother     Hypertension Mother     Other Cancer Mother         Lung    Asthma Mother     Osteoporosis Mother     Diabetes Type 2  Mother     Musculoskeletal Disorder Father         ALS;     Lipids Brother         hypercholestermia    Depression Son     Hypertension Maternal Half-Brother     Cerebrovascular Disease Maternal Half-Brother 65    Hyperlipidemia Maternal Half-Brother     Cerebrovascular Disease Maternal Half-Brother          Current Outpatient Medications   Medication Sig Dispense Refill    Biotin 5000 MCG CAPS Take 1 tablet by mouth daily 30 capsule 0    CHELATED MAGNESIUM PO Take 250 mg by mouth      [START ON 10/24/2024] estradiol (VAGIFEM) 10 MCG TABS vaginal tablet Place 1 tablet (10 mcg) vaginally twice a week.  "24 tablet 3    hydrocortisone 2.5 % ointment Apply topically 2 times daily. 30 g 1    levothyroxine (SYNTHROID/LEVOTHROID) 112 MCG tablet TAKE ONE TABLET BY MOUTH ONE TIME DAILY 90 tablet 3    losartan (COZAAR) 100 MG tablet Take 1 tablet (100 mg) by mouth daily. 90 tablet 3    cholecalciferol (VITAMIN D3) 5000 UNITS CAPS capsule Take 1 capsule (5,000 Units) by mouth every other day 15 capsule 0    Krill Oil 350 MG CAPS       Lutein-Zeaxanthin 25-5 MG CAPS       Menaquinone-7 (VITAMIN K2 PO)       Misc Natural Products (LUTEIN 20) CAPS Take 1 tablet by mouth daily Reported on 3/23/2017 30 capsule 0    Omega-3 Fatty Acids (OMEGA-3 FISH OIL PO)       order for DME Equipment being ordered: Automatic blood pressure cuff 1 each 0    zinc gluconate 50 MG tablet Take 50 mg by mouth daily       Allergies   Allergen Reactions    No Known Drug Allergy      Review of Systems  Constitutional, HEENT, cardiovascular, pulmonary, GI, , musculoskeletal, neuro, skin, endocrine and psych systems are negative, except as otherwise noted.       Objective    Exam  BP (!) 143/92 (BP Location: Right arm, Patient Position: Sitting, Cuff Size: Adult Regular)   Pulse 93   Temp 97.9  F (36.6  C) (Temporal)   Resp 15   Ht 1.664 m (5' 5.5\")   Wt 72.2 kg (159 lb 3.2 oz)   SpO2 97%   BMI 26.09 kg/m     Estimated body mass index is 26.09 kg/m  as calculated from the following:    Height as of this encounter: 1.664 m (5' 5.5\").    Weight as of this encounter: 72.2 kg (159 lb 3.2 oz).    Physical Exam  GENERAL: alert and no distress  EYES: Eyes grossly normal to inspection, PERRL, conjunctivae and sclerae normal  HENT: ear canals and TM's normal, nose and mouth without ulcers or lesions  NECK: no adenopathy, no asymmetry, masses, or scars  RESP: lungs clear to auscultation - no rales, rhonchi or wheezes  CV: regular rate and rhythm, normal S1 S2, no S3 or S4, no murmur, click or rub, no peripheral edema  ABDOMEN: soft, nontender, no masses " and bowel sounds normal   (female): normal female external genitalia, normal urethral meatus, normal vaginal mucosa  MS: no gross musculoskeletal defects noted, no edema  SKIN: no suspicious lesions or rashes  NEURO: Normal strength and tone, mentation intact and speech normal  PSYCH: mentation appears normal, affect normal/bright      Signed Electronically by: Jeanette Lorenzo PA-C

## 2024-10-24 LAB
HPV HR 12 DNA CVX QL NAA+PROBE: NEGATIVE
HPV16 DNA CVX QL NAA+PROBE: NEGATIVE
HPV18 DNA CVX QL NAA+PROBE: NEGATIVE
HUMAN PAPILLOMA VIRUS FINAL DIAGNOSIS: NORMAL

## 2024-10-28 LAB
BKR AP ASSOCIATED HPV REPORT: NORMAL
BKR LAB AP GYN ADEQUACY: NORMAL
BKR LAB AP GYN INTERPRETATION: NORMAL
BKR LAB AP PREVIOUS ABNORMAL: NORMAL
PATH REPORT.COMMENTS IMP SPEC: NORMAL
PATH REPORT.COMMENTS IMP SPEC: NORMAL
PATH REPORT.RELEVANT HX SPEC: NORMAL

## 2024-10-30 ENCOUNTER — MYC MEDICAL ADVICE (OUTPATIENT)
Dept: FAMILY MEDICINE | Facility: CLINIC | Age: 64
End: 2024-10-30
Payer: COMMERCIAL

## 2024-10-30 NOTE — TELEPHONE ENCOUNTER
Jeanette Lorenzo PA-C- See pt's [employer] message. Please review and advise.     Routed to PCP    Yahaira MORENO RN   Clinic RN  [facility]

## 2025-08-18 ENCOUNTER — TRANSFERRED RECORDS (OUTPATIENT)
Dept: HEALTH INFORMATION MANAGEMENT | Facility: CLINIC | Age: 65
End: 2025-08-18

## 2025-08-18 ENCOUNTER — OFFICE VISIT (OUTPATIENT)
Dept: FAMILY MEDICINE | Facility: CLINIC | Age: 65
End: 2025-08-18
Payer: COMMERCIAL

## 2025-08-18 VITALS
RESPIRATION RATE: 18 BRPM | OXYGEN SATURATION: 99 % | WEIGHT: 161 LBS | HEART RATE: 100 BPM | TEMPERATURE: 99.2 F | BODY MASS INDEX: 25.88 KG/M2 | DIASTOLIC BLOOD PRESSURE: 98 MMHG | SYSTOLIC BLOOD PRESSURE: 136 MMHG | HEIGHT: 66 IN

## 2025-08-18 DIAGNOSIS — Z00.00 ENCOUNTER FOR MEDICARE ANNUAL WELLNESS EXAM: Primary | ICD-10-CM

## 2025-08-18 DIAGNOSIS — D12.6 TUBULAR ADENOMA OF COLON: ICD-10-CM

## 2025-08-18 DIAGNOSIS — I10 ESSENTIAL HYPERTENSION, BENIGN: ICD-10-CM

## 2025-08-18 RX ORDER — HYDROCHLOROTHIAZIDE 12.5 MG/1
12.5 TABLET ORAL DAILY
Qty: 30 TABLET | Refills: 1 | Status: SHIPPED | OUTPATIENT
Start: 2025-08-18

## 2025-08-18 SDOH — HEALTH STABILITY: PHYSICAL HEALTH: ON AVERAGE, HOW MANY DAYS PER WEEK DO YOU ENGAGE IN MODERATE TO STRENUOUS EXERCISE (LIKE A BRISK WALK)?: 7 DAYS

## 2025-08-18 ASSESSMENT — PATIENT HEALTH QUESTIONNAIRE - PHQ9: 5. POOR APPETITE OR OVEREATING: NOT AT ALL

## 2025-08-18 ASSESSMENT — ANXIETY QUESTIONNAIRES
6. BECOMING EASILY ANNOYED OR IRRITABLE: NOT AT ALL
GAD7 TOTAL SCORE: 1
7. FEELING AFRAID AS IF SOMETHING AWFUL MIGHT HAPPEN: NOT AT ALL
5. BEING SO RESTLESS THAT IT IS HARD TO SIT STILL: NOT AT ALL
2. NOT BEING ABLE TO STOP OR CONTROL WORRYING: NOT AT ALL
1. FEELING NERVOUS, ANXIOUS, OR ON EDGE: SEVERAL DAYS
IF YOU CHECKED OFF ANY PROBLEMS ON THIS QUESTIONNAIRE, HOW DIFFICULT HAVE THESE PROBLEMS MADE IT FOR YOU TO DO YOUR WORK, TAKE CARE OF THINGS AT HOME, OR GET ALONG WITH OTHER PEOPLE: NOT DIFFICULT AT ALL
3. WORRYING TOO MUCH ABOUT DIFFERENT THINGS: NOT AT ALL
GAD7 TOTAL SCORE: 1

## 2025-08-18 ASSESSMENT — SOCIAL DETERMINANTS OF HEALTH (SDOH): HOW OFTEN DO YOU GET TOGETHER WITH FRIENDS OR RELATIVES?: ONCE A WEEK

## 2025-08-21 ENCOUNTER — PATIENT OUTREACH (OUTPATIENT)
Dept: CARE COORDINATION | Facility: CLINIC | Age: 65
End: 2025-08-21
Payer: COMMERCIAL

## (undated) DEVICE — KIT ENDO TURNOVER/PROCEDURE W/CLEAN A SCOPE LINERS 103888

## (undated) RX ORDER — FENTANYL CITRATE 50 UG/ML
INJECTION, SOLUTION INTRAMUSCULAR; INTRAVENOUS
Status: DISPENSED
Start: 2019-10-22